# Patient Record
Sex: FEMALE | Race: WHITE | NOT HISPANIC OR LATINO | URBAN - METROPOLITAN AREA
[De-identification: names, ages, dates, MRNs, and addresses within clinical notes are randomized per-mention and may not be internally consistent; named-entity substitution may affect disease eponyms.]

---

## 2023-05-17 ENCOUNTER — APPOINTMENT (OUTPATIENT)
Dept: PRIMARY CARE | Facility: CLINIC | Age: 33
End: 2023-05-17
Payer: COMMERCIAL

## 2023-06-22 ENCOUNTER — OFFICE VISIT (OUTPATIENT)
Dept: PRIMARY CARE | Facility: CLINIC | Age: 33
End: 2023-06-22
Payer: COMMERCIAL

## 2023-06-22 VITALS
SYSTOLIC BLOOD PRESSURE: 112 MMHG | BODY MASS INDEX: 32.55 KG/M2 | WEIGHT: 172.4 LBS | RESPIRATION RATE: 18 BRPM | DIASTOLIC BLOOD PRESSURE: 80 MMHG | HEIGHT: 61 IN | HEART RATE: 121 BPM | OXYGEN SATURATION: 98 %

## 2023-06-22 DIAGNOSIS — F33.1 MODERATE EPISODE OF RECURRENT MAJOR DEPRESSIVE DISORDER (MULTI): Primary | ICD-10-CM

## 2023-06-22 PROCEDURE — 99214 OFFICE O/P EST MOD 30 MIN: CPT | Performed by: NURSE PRACTITIONER

## 2023-06-22 RX ORDER — ASPIRIN 325 MG
50000 TABLET, DELAYED RELEASE (ENTERIC COATED) ORAL
COMMUNITY
Start: 2021-08-09

## 2023-06-22 NOTE — ASSESSMENT & PLAN NOTE
Has tried Cymbalta, escitalopram, bupropion, BuSpar, Effexor and had side effects and did not help  Start Trintellix  Red flags discussed including SI  Also discussed starting CBT and talk therapy  Side effects discussed  Follow-up in 4 to 6 weeks

## 2023-06-22 NOTE — PROGRESS NOTES
"Subjective   Patient ID: Hannah Zayas is a 33 y.o. female who presents for Follow-up (Patient in today to discuss concerns of ongoing depression. Stated she thought she was scheduling for a CPE today. ).    Presents to follow up for depression  She states over the past year she has had generalized joint and body pain.  She stopped taking the cymbalta almost one year ago due to the side effects - pain was not improving   She is feeling more overwhelmed.    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  One month ago she stopped drinking and realized that she was drinking heavily every day and was helping her with her depression.  Now has decreased to 2-3 times a week    She has tried wellbutrin - made her extremely anxious.  Tried buspar - did not help.  Tried lexapro - only helped slightly.    Cymbalta - had worsening side effects.  Effexor - did 'ok' but then stopped helping             Review of Systems   All other systems reviewed and are negative.      Objective   /80   Pulse (!) 121   Resp 18   Ht 1.549 m (5' 1\")   Wt 78.2 kg (172 lb 6.4 oz)   SpO2 98%   BMI 32.57 kg/m²     Physical Exam  Vitals and nursing note reviewed.   Constitutional:       Appearance: Normal appearance.   HENT:      Head: Normocephalic and atraumatic.      Right Ear: External ear normal.      Left Ear: External ear normal.      Nose: Nose normal.      Mouth/Throat:      Mouth: Mucous membranes are moist.      Pharynx: Oropharynx is clear.   Eyes:      Conjunctiva/sclera: Conjunctivae normal.      Pupils: Pupils are equal, round, and reactive to light.   Cardiovascular:      Rate and Rhythm: Normal rate and regular rhythm.      Pulses: Normal pulses.      Heart sounds: Normal heart sounds.   Pulmonary:      Effort: Pulmonary effort is normal.      Breath sounds: Normal breath sounds.   Skin:     " General: Skin is warm and dry.   Neurological:      General: No focal deficit present.      Mental Status: She is alert and oriented to person, place, and time. Mental status is at baseline.   Psychiatric:         Mood and Affect: Mood normal.         Behavior: Behavior normal.         Thought Content: Thought content normal.         Judgment: Judgment normal.         Assessment/Plan   Problem List Items Addressed This Visit       Moderate episode of recurrent major depressive disorder (CMS/HCC) - Primary     Has tried Cymbalta, escitalopram, bupropion, BuSpar, Effexor and had side effects and did not help  Start Trintellix  Red flags discussed including SI  Also discussed starting CBT and talk therapy  Side effects discussed  Follow-up in 4 to 6 weeks         Relevant Medications    vortioxetine (Trintellix) 5 mg tablet tablet

## 2023-06-22 NOTE — PATIENT INSTRUCTIONS
Start the trintellix once a day  Give the pharmacist the copay card  If it is still expensive please let me know and I can see if another medication is covered  I may have to do an authorization with the insurance for this medication and that can take 7-10 business days

## 2023-10-09 ENCOUNTER — PATIENT MESSAGE (OUTPATIENT)
Dept: PRIMARY CARE | Facility: CLINIC | Age: 33
End: 2023-10-09
Payer: COMMERCIAL

## 2023-10-09 DIAGNOSIS — F33.1 MODERATE EPISODE OF RECURRENT MAJOR DEPRESSIVE DISORDER (MULTI): Primary | ICD-10-CM

## 2023-10-10 RX ORDER — FLUOXETINE HYDROCHLORIDE 20 MG/1
20 CAPSULE ORAL DAILY
Qty: 30 CAPSULE | Refills: 3 | Status: SHIPPED | OUTPATIENT
Start: 2023-10-10 | End: 2023-11-01

## 2023-10-18 RX ORDER — HYDROXYZINE HYDROCHLORIDE 50 MG/1
50 TABLET, FILM COATED ORAL NIGHTLY
Qty: 30 TABLET | Refills: 0 | Status: SHIPPED | OUTPATIENT
Start: 2023-10-18 | End: 2023-11-01

## 2024-01-31 ENCOUNTER — OFFICE VISIT (OUTPATIENT)
Dept: PRIMARY CARE | Facility: CLINIC | Age: 34
End: 2024-01-31
Payer: COMMERCIAL

## 2024-01-31 VITALS
WEIGHT: 171 LBS | HEIGHT: 62 IN | BODY MASS INDEX: 31.47 KG/M2 | OXYGEN SATURATION: 98 % | DIASTOLIC BLOOD PRESSURE: 88 MMHG | HEART RATE: 103 BPM | SYSTOLIC BLOOD PRESSURE: 128 MMHG

## 2024-01-31 DIAGNOSIS — F33.1 MODERATE EPISODE OF RECURRENT MAJOR DEPRESSIVE DISORDER (MULTI): Primary | ICD-10-CM

## 2024-01-31 PROCEDURE — 99214 OFFICE O/P EST MOD 30 MIN: CPT | Performed by: FAMILY MEDICINE

## 2024-01-31 RX ORDER — SERTRALINE HYDROCHLORIDE 50 MG/1
50 TABLET, FILM COATED ORAL DAILY
Qty: 30 TABLET | Refills: 1 | Status: SHIPPED | OUTPATIENT
Start: 2024-01-31 | End: 2024-02-29 | Stop reason: SDUPTHER

## 2024-01-31 RX ORDER — TRAZODONE HYDROCHLORIDE 100 MG/1
100 TABLET ORAL NIGHTLY PRN
Qty: 30 TABLET | Refills: 0 | Status: SHIPPED | OUTPATIENT
Start: 2024-01-31 | End: 2024-02-29 | Stop reason: SDUPTHER

## 2024-01-31 ASSESSMENT — PATIENT HEALTH QUESTIONNAIRE - PHQ9
SUM OF ALL RESPONSES TO PHQ9 QUESTIONS 1 AND 2: 6
10. IF YOU CHECKED OFF ANY PROBLEMS, HOW DIFFICULT HAVE THESE PROBLEMS MADE IT FOR YOU TO DO YOUR WORK, TAKE CARE OF THINGS AT HOME, OR GET ALONG WITH OTHER PEOPLE: EXTREMELY DIFFICULT
4. FEELING TIRED OR HAVING LITTLE ENERGY: NEARLY EVERY DAY
3. TROUBLE FALLING OR STAYING ASLEEP OR SLEEPING TOO MUCH: NEARLY EVERY DAY
9. THOUGHTS THAT YOU WOULD BE BETTER OFF DEAD, OR OF HURTING YOURSELF: SEVERAL DAYS
6. FEELING BAD ABOUT YOURSELF - OR THAT YOU ARE A FAILURE OR HAVE LET YOURSELF OR YOUR FAMILY DOWN: MORE THAN HALF THE DAYS
8. MOVING OR SPEAKING SO SLOWLY THAT OTHER PEOPLE COULD HAVE NOTICED. OR THE OPPOSITE, BEING SO FIGETY OR RESTLESS THAT YOU HAVE BEEN MOVING AROUND A LOT MORE THAN USUAL: NEARLY EVERY DAY
7. TROUBLE CONCENTRATING ON THINGS, SUCH AS READING THE NEWSPAPER OR WATCHING TELEVISION: NEARLY EVERY DAY
SUM OF ALL RESPONSES TO PHQ QUESTIONS 1-9: 23
1. LITTLE INTEREST OR PLEASURE IN DOING THINGS: NEARLY EVERY DAY
5. POOR APPETITE OR OVEREATING: MORE THAN HALF THE DAYS
2. FEELING DOWN, DEPRESSED OR HOPELESS: NEARLY EVERY DAY

## 2024-01-31 ASSESSMENT — PAIN SCALES - GENERAL: PAINLEVEL: 6

## 2024-01-31 NOTE — LETTER
January 31, 2024     Patient: Tata Zayas   YOB: 1990   Date of Visit: 1/31/2024       To Whom It May Concern:    Tata Zayas was seen in my clinic on 1/31/2024. Please excuse Tata for her absence from work on this day to make the appointment.    If you have any questions or concerns, please don't hesitate to call.         Sincerely,         Luz Ballesteros MD        CC: No Recipients

## 2024-01-31 NOTE — PROGRESS NOTES
"Subjective   Patient ID: Hannah Zayas is a 33 y.o. female who presents for Depression (Abnormal menses).    HPI   Tata presents complaining of depression.  Started on Prozac and after 4 days didn't sleep and had panic attack so quit taking.  Tried Trintillex by another provider which helped but had insurancve issues.  Has also tried cymbalta, lexapro, effexor, and Wellbutrin without side effects. Working with counselor. Not sleeping well so would also like to try something else to help that.     Review of Systems  All other systems have been reviewed and are negative except as noted in the HPI.       Objective   /88   Pulse 103   Ht 1.575 m (5' 2\")   Wt 77.6 kg (171 lb)   SpO2 98%   BMI 31.28 kg/m²     Physical Exam  Alert. NAD. Tearful at times. Flat mood. Judgement and insight intact    Assessment/Plan   Diagnoses and all orders for this visit:  Moderate episode of recurrent major depressive disorder (CMS/HCC)  -     sertraline (Zoloft) 50 mg tablet; Take 1 tablet (50 mg) by mouth once daily.  -     traZODone (Desyrel) 100 mg tablet; Take 1 tablet (100 mg) by mouth as needed at bedtime for sleep.  - Counseling discussed.  - Follow up in 1 month       "

## 2024-02-29 ENCOUNTER — TELEPHONE (OUTPATIENT)
Dept: PRIMARY CARE | Facility: CLINIC | Age: 34
End: 2024-02-29
Payer: COMMERCIAL

## 2024-02-29 DIAGNOSIS — F33.1 MODERATE EPISODE OF RECURRENT MAJOR DEPRESSIVE DISORDER (MULTI): ICD-10-CM

## 2024-02-29 NOTE — TELEPHONE ENCOUNTER
Rx Refill Request Telephone Encounter    Name:  Tata Zayas  :  [id number]  Medication Name:  Zoloft (out tomorrow), Trazodone            Specific Pharmacy location:  [facility]  Date of last appointment:    Date of next appointment:    Best number to reach patient:

## 2024-03-01 RX ORDER — TRAZODONE HYDROCHLORIDE 100 MG/1
100 TABLET ORAL NIGHTLY PRN
Qty: 30 TABLET | Refills: 0 | Status: SHIPPED | OUTPATIENT
Start: 2024-03-01 | End: 2024-04-17 | Stop reason: SDUPTHER

## 2024-03-01 RX ORDER — SERTRALINE HYDROCHLORIDE 50 MG/1
50 TABLET, FILM COATED ORAL DAILY
Qty: 90 TABLET | Refills: 0 | Status: SHIPPED | OUTPATIENT
Start: 2024-03-01 | End: 2024-05-24 | Stop reason: DRUGHIGH

## 2024-04-17 ENCOUNTER — OFFICE VISIT (OUTPATIENT)
Dept: PRIMARY CARE | Facility: CLINIC | Age: 34
End: 2024-04-17
Payer: COMMERCIAL

## 2024-04-17 VITALS
SYSTOLIC BLOOD PRESSURE: 122 MMHG | OXYGEN SATURATION: 96 % | WEIGHT: 177 LBS | HEART RATE: 108 BPM | DIASTOLIC BLOOD PRESSURE: 86 MMHG | BODY MASS INDEX: 32.37 KG/M2

## 2024-04-17 DIAGNOSIS — Q79.60 EHLERS-DANLOS DISEASE (HHS-HCC): ICD-10-CM

## 2024-04-17 DIAGNOSIS — M06.09 RHEUMATOID ARTHRITIS OF MULTIPLE SITES WITH NEGATIVE RHEUMATOID FACTOR (MULTI): ICD-10-CM

## 2024-04-17 DIAGNOSIS — F33.1 MODERATE EPISODE OF RECURRENT MAJOR DEPRESSIVE DISORDER (MULTI): Primary | ICD-10-CM

## 2024-04-17 DIAGNOSIS — Z00.00 WELL ADULT EXAM: ICD-10-CM

## 2024-04-17 PROBLEM — R79.89 LOW VITAMIN D LEVEL: Status: ACTIVE | Noted: 2024-04-17

## 2024-04-17 PROBLEM — K21.9 GASTROESOPHAGEAL REFLUX DISEASE: Status: ACTIVE | Noted: 2024-04-17

## 2024-04-17 PROCEDURE — 99213 OFFICE O/P EST LOW 20 MIN: CPT | Performed by: FAMILY MEDICINE

## 2024-04-17 RX ORDER — TRAZODONE HYDROCHLORIDE 100 MG/1
200 TABLET ORAL NIGHTLY PRN
Qty: 180 TABLET | Refills: 1 | Status: SHIPPED | OUTPATIENT
Start: 2024-04-17 | End: 2024-10-14

## 2024-04-17 ASSESSMENT — PATIENT HEALTH QUESTIONNAIRE - PHQ9
2. FEELING DOWN, DEPRESSED OR HOPELESS: SEVERAL DAYS
1. LITTLE INTEREST OR PLEASURE IN DOING THINGS: SEVERAL DAYS
10. IF YOU CHECKED OFF ANY PROBLEMS, HOW DIFFICULT HAVE THESE PROBLEMS MADE IT FOR YOU TO DO YOUR WORK, TAKE CARE OF THINGS AT HOME, OR GET ALONG WITH OTHER PEOPLE: VERY DIFFICULT
SUM OF ALL RESPONSES TO PHQ9 QUESTIONS 1 AND 2: 2

## 2024-04-17 ASSESSMENT — PAIN SCALES - GENERAL: PAINLEVEL: 5

## 2024-04-17 NOTE — PROGRESS NOTES
Subjective   Patient ID: Hannah Zayas is a 34 y.o. female who presents for med review (sertraline).    HPI   Hannah presents for follow-up on her depression.  She was seen in January and started on sertraline and trazodone.  In the past she has taken Cymbalta, Lexapro, Effexor, Wellbutrin and Trintellix.  Those were not as effective.  She did like the Trintellix but her insurance would not cover it. Feels the sertraline is improving. Still not sleeping well. Therapist feels she has ADHD and needs treatment. [facility].     Review of Systems  All other systems have been reviewed and are negative except as noted in the HPI.       Objective   /86 (BP Location: Left arm, Patient Position: Sitting)   Pulse 108   Wt 80.3 kg (177 lb)   SpO2 96%   BMI 32.37 kg/m²     Physical Exam  Alert. NAD. Judgement and insight intact. Mood is appropriate.    Assessment/Plan   Problem List Items Addressed This Visit             ICD-10-CM    Moderate episode of recurrent major depressive disorder (Multi) - Primary F33.1     Improving with sertraline.  Increase Trazodone to 200mg nightly to improve sleep.  Contact therapist for formal testing for ADHD through psychiatrist in group.         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 Begin gently stretching.         Relevant Orders    CBC and Auto Differential    Comprehensive Metabolic Panel    Hemoglobin A1C    Urinalysis with Reflex Microscopic    Rheumatoid arthritis of multiple sites with negative rheumatoid factor (Multi) M06.09     Pain is stable and chronic. Not seeing rheumatologist.  Recommend following up with Dr. Nanette Sanderson         Relevant Orders    CBC and Auto Differential    Comprehensive Metabolic Panel    Hemoglobin A1C    Urinalysis with  Reflex Microscopic     Other Visit Diagnoses         Codes    Well adult exam     Z00.00    Relevant Orders    CBC and Auto Differential    Comprehensive Metabolic Panel    Lipid Panel    Hemoglobin A1C    Urinalysis with Reflex Microscopic          Follow up for CPE in 6 months

## 2024-04-17 NOTE — ASSESSMENT & PLAN NOTE
Pain is stable and chronic. Not seeing rheumatologist.  Recommend following up with Dr. Nanette Sanderson

## 2024-05-04 ENCOUNTER — LAB (OUTPATIENT)
Dept: LAB | Facility: LAB | Age: 34
End: 2024-05-04
Payer: COMMERCIAL

## 2024-05-04 DIAGNOSIS — M06.09 RHEUMATOID ARTHRITIS OF MULTIPLE SITES WITH NEGATIVE RHEUMATOID FACTOR (MULTI): ICD-10-CM

## 2024-05-04 DIAGNOSIS — Z00.00 WELL ADULT EXAM: ICD-10-CM

## 2024-05-04 DIAGNOSIS — F33.1 MODERATE EPISODE OF RECURRENT MAJOR DEPRESSIVE DISORDER (MULTI): ICD-10-CM

## 2024-05-04 DIAGNOSIS — Q79.60 EHLERS-DANLOS DISEASE (HHS-HCC): ICD-10-CM

## 2024-05-04 LAB
ALBUMIN SERPL BCP-MCNC: 4.6 G/DL (ref 3.4–5)
ALP SERPL-CCNC: 51 U/L (ref 33–110)
ALT SERPL W P-5'-P-CCNC: 29 U/L (ref 7–45)
ANION GAP SERPL CALC-SCNC: 16 MMOL/L (ref 10–20)
APPEARANCE UR: ABNORMAL
AST SERPL W P-5'-P-CCNC: 17 U/L (ref 9–39)
BASOPHILS # BLD AUTO: 0.05 X10*3/UL (ref 0–0.1)
BASOPHILS NFR BLD AUTO: 0.5 %
BILIRUB SERPL-MCNC: 0.7 MG/DL (ref 0–1.2)
BILIRUB UR STRIP.AUTO-MCNC: NEGATIVE MG/DL
BUN SERPL-MCNC: 13 MG/DL (ref 6–23)
CALCIUM SERPL-MCNC: 9.4 MG/DL (ref 8.6–10.3)
CHLORIDE SERPL-SCNC: 100 MMOL/L (ref 98–107)
CHOLEST SERPL-MCNC: 225 MG/DL (ref 0–199)
CHOLESTEROL/HDL RATIO: 4.4
CO2 SERPL-SCNC: 26 MMOL/L (ref 21–32)
COLOR UR: YELLOW
CREAT SERPL-MCNC: 0.88 MG/DL (ref 0.5–1.05)
EGFRCR SERPLBLD CKD-EPI 2021: 88 ML/MIN/1.73M*2
EOSINOPHIL # BLD AUTO: 0.23 X10*3/UL (ref 0–0.7)
EOSINOPHIL NFR BLD AUTO: 2.1 %
ERYTHROCYTE [DISTWIDTH] IN BLOOD BY AUTOMATED COUNT: 12.4 % (ref 11.5–14.5)
EST. AVERAGE GLUCOSE BLD GHB EST-MCNC: 100 MG/DL
GLUCOSE SERPL-MCNC: 87 MG/DL (ref 74–99)
GLUCOSE UR STRIP.AUTO-MCNC: NEGATIVE MG/DL
HBA1C MFR BLD: 5.1 %
HCT VFR BLD AUTO: 45 % (ref 36–46)
HDLC SERPL-MCNC: 51.1 MG/DL
HGB BLD-MCNC: 14.7 G/DL (ref 12–16)
IMM GRANULOCYTES # BLD AUTO: 0.04 X10*3/UL (ref 0–0.7)
IMM GRANULOCYTES NFR BLD AUTO: 0.4 % (ref 0–0.9)
KETONES UR STRIP.AUTO-MCNC: NEGATIVE MG/DL
LDLC SERPL CALC-MCNC: 142 MG/DL
LEUKOCYTE ESTERASE UR QL STRIP.AUTO: NEGATIVE
LYMPHOCYTES # BLD AUTO: 2.32 X10*3/UL (ref 1.2–4.8)
LYMPHOCYTES NFR BLD AUTO: 21.2 %
MCH RBC QN AUTO: 31.6 PG (ref 26–34)
MCHC RBC AUTO-ENTMCNC: 32.7 G/DL (ref 32–36)
MCV RBC AUTO: 97 FL (ref 80–100)
MONOCYTES # BLD AUTO: 0.56 X10*3/UL (ref 0.1–1)
MONOCYTES NFR BLD AUTO: 5.1 %
NEUTROPHILS # BLD AUTO: 7.72 X10*3/UL (ref 1.2–7.7)
NEUTROPHILS NFR BLD AUTO: 70.7 %
NITRITE UR QL STRIP.AUTO: NEGATIVE
NON HDL CHOLESTEROL: 174 MG/DL (ref 0–149)
NRBC BLD-RTO: 0 /100 WBCS (ref 0–0)
PH UR STRIP.AUTO: 7 [PH]
PLATELET # BLD AUTO: 282 X10*3/UL (ref 150–450)
POTASSIUM SERPL-SCNC: 4.2 MMOL/L (ref 3.5–5.3)
PROT SERPL-MCNC: 6.8 G/DL (ref 6.4–8.2)
PROT UR STRIP.AUTO-MCNC: NEGATIVE MG/DL
RBC # BLD AUTO: 4.65 X10*6/UL (ref 4–5.2)
RBC # UR STRIP.AUTO: NEGATIVE /UL
SODIUM SERPL-SCNC: 138 MMOL/L (ref 136–145)
SP GR UR STRIP.AUTO: 1.02
TRIGL SERPL-MCNC: 162 MG/DL (ref 0–149)
UROBILINOGEN UR STRIP.AUTO-MCNC: <2 MG/DL
VLDL: 32 MG/DL (ref 0–40)
WBC # BLD AUTO: 10.9 X10*3/UL (ref 4.4–11.3)

## 2024-05-04 PROCEDURE — 80061 LIPID PANEL: CPT

## 2024-05-04 PROCEDURE — 81003 URINALYSIS AUTO W/O SCOPE: CPT

## 2024-05-04 PROCEDURE — 83036 HEMOGLOBIN GLYCOSYLATED A1C: CPT

## 2024-05-04 PROCEDURE — 85025 COMPLETE CBC W/AUTO DIFF WBC: CPT

## 2024-05-04 PROCEDURE — 80053 COMPREHEN METABOLIC PANEL: CPT

## 2024-05-04 PROCEDURE — 36415 COLL VENOUS BLD VENIPUNCTURE: CPT

## 2024-05-24 ENCOUNTER — TELEPHONE (OUTPATIENT)
Dept: PRIMARY CARE | Facility: CLINIC | Age: 34
End: 2024-05-24

## 2024-05-24 ENCOUNTER — OFFICE VISIT (OUTPATIENT)
Dept: PRIMARY CARE | Facility: CLINIC | Age: 34
End: 2024-05-24
Payer: COMMERCIAL

## 2024-05-24 VITALS
TEMPERATURE: 98.2 F | DIASTOLIC BLOOD PRESSURE: 72 MMHG | OXYGEN SATURATION: 98 % | BODY MASS INDEX: 33.8 KG/M2 | WEIGHT: 184.8 LBS | HEART RATE: 113 BPM | SYSTOLIC BLOOD PRESSURE: 100 MMHG

## 2024-05-24 DIAGNOSIS — M06.09 RHEUMATOID ARTHRITIS OF MULTIPLE SITES WITH NEGATIVE RHEUMATOID FACTOR (MULTI): ICD-10-CM

## 2024-05-24 DIAGNOSIS — F33.1 MODERATE EPISODE OF RECURRENT MAJOR DEPRESSIVE DISORDER (MULTI): ICD-10-CM

## 2024-05-24 DIAGNOSIS — E66.1 CLASS 1 DRUG-INDUCED OBESITY WITH SERIOUS COMORBIDITY AND BODY MASS INDEX (BMI) OF 33.0 TO 33.9 IN ADULT: Primary | ICD-10-CM

## 2024-05-24 PROCEDURE — 99213 OFFICE O/P EST LOW 20 MIN: CPT | Performed by: FAMILY MEDICINE

## 2024-05-24 PROCEDURE — 3008F BODY MASS INDEX DOCD: CPT | Performed by: FAMILY MEDICINE

## 2024-05-24 RX ORDER — SEMAGLUTIDE 0.25 MG/.5ML
0.25 INJECTION, SOLUTION SUBCUTANEOUS
Qty: 2 ML | Refills: 0 | Status: SHIPPED | OUTPATIENT
Start: 2024-05-26 | End: 2024-06-17

## 2024-05-24 RX ORDER — SERTRALINE HYDROCHLORIDE 100 MG/1
100 TABLET, FILM COATED ORAL DAILY
Qty: 90 TABLET | Refills: 0 | Status: SHIPPED | OUTPATIENT
Start: 2024-05-24 | End: 2024-08-22

## 2024-05-24 ASSESSMENT — PAIN SCALES - GENERAL: PAINLEVEL: 5

## 2024-05-24 NOTE — TELEPHONE ENCOUNTER
[facility] pharmacy sent a fax patients Wegovy 0.25 mg/0.5 ML Pen is not covered by patients insurance and they are requesting an alternative medication.  Please advise.

## 2024-05-24 NOTE — PROGRESS NOTES
Subjective   Patient ID: Hannah Zayas is a 34 y.o. female who presents for Follow-up.    HPI   Hannah presents for obesity.  She has been trying to modify her diet. Unable to lose weight.  Exercise is a major issue due to her joint pain and muscle aches from her underlying Ehler-Danlos and RA.  Working with rheumatologist to control her pain but knows that she needs to lose weight for improvement.    Review of Systems  All other systems have been reviewed and are negative except as noted in the HPI.       Objective   /72 (BP Location: Left arm)   Pulse (!) 113   Temp 36.8 °C (98.2 °F)   Wt 83.8 kg (184 lb 12.8 oz)   SpO2 98%   BMI 33.80 kg/m²     Physical Exam  Alert. NAD. Judgement and insight intact.    Assessment/Plan   Problem List Items Addressed This Visit             ICD-10-CM    Moderate episode of recurrent major depressive disorder (Multi) F33.1    Relevant Medications    sertraline (Zoloft) 100 mg tablet    Rheumatoid arthritis of multiple sites with negative rheumatoid factor (Multi) M06.09    Relevant Medications    semaglutide, weight loss, (Wegovy) 0.25 mg/0.5 mL pen injector     Other Visit Diagnoses         Codes    Class 1 drug-induced obesity with serious comorbidity and body mass index (BMI) of 33.0 to 33.9 in adult    -  Primary  Treatment options discussed.  Given her RA and EDS recommend beginning Wegovy.  Indications, benefits and potential side effects discussed.  E66.1, Z68.33    Relevant Medications    semaglutide, weight loss, (Wegovy) 0.25 mg/0.5 mL pen injector

## 2024-07-10 ENCOUNTER — APPOINTMENT (OUTPATIENT)
Dept: PRIMARY CARE | Facility: CLINIC | Age: 34
End: 2024-07-10
Payer: COMMERCIAL

## 2024-07-18 ENCOUNTER — TELEPHONE (OUTPATIENT)
Dept: PRIMARY CARE | Facility: CLINIC | Age: 34
End: 2024-07-18
Payer: COMMERCIAL

## 2024-07-18 DIAGNOSIS — Z00.00 WELL ADULT EXAM: ICD-10-CM

## 2024-07-18 DIAGNOSIS — Z11.59 NEED FOR HEPATITIS C SCREENING TEST: ICD-10-CM

## 2024-07-18 DIAGNOSIS — Z11.4 ENCOUNTER FOR SCREENING FOR HIV: ICD-10-CM

## 2024-08-26 DIAGNOSIS — F33.1 MODERATE EPISODE OF RECURRENT MAJOR DEPRESSIVE DISORDER (MULTI): ICD-10-CM

## 2024-08-26 RX ORDER — SERTRALINE HYDROCHLORIDE 100 MG/1
100 TABLET, FILM COATED ORAL DAILY
Qty: 90 TABLET | Refills: 0 | Status: SHIPPED | OUTPATIENT
Start: 2024-08-26

## 2024-09-21 ENCOUNTER — LAB (OUTPATIENT)
Dept: LAB | Facility: LAB | Age: 34
End: 2024-09-21
Payer: COMMERCIAL

## 2024-09-21 DIAGNOSIS — Z00.00 WELL ADULT EXAM: ICD-10-CM

## 2024-09-21 DIAGNOSIS — Z11.59 NEED FOR HEPATITIS C SCREENING TEST: ICD-10-CM

## 2024-09-21 DIAGNOSIS — Z11.4 ENCOUNTER FOR SCREENING FOR HIV: ICD-10-CM

## 2024-09-21 LAB
ALBUMIN SERPL BCP-MCNC: 4.4 G/DL (ref 3.4–5)
ALP SERPL-CCNC: 58 U/L (ref 33–110)
ALT SERPL W P-5'-P-CCNC: 66 U/L (ref 7–45)
ANION GAP SERPL CALC-SCNC: 16 MMOL/L (ref 10–20)
AST SERPL W P-5'-P-CCNC: 46 U/L (ref 9–39)
BILIRUB SERPL-MCNC: 0.4 MG/DL (ref 0–1.2)
BUN SERPL-MCNC: 13 MG/DL (ref 6–23)
CALCIUM SERPL-MCNC: 10 MG/DL (ref 8.6–10.3)
CHLORIDE SERPL-SCNC: 102 MMOL/L (ref 98–107)
CHOLEST SERPL-MCNC: 227 MG/DL (ref 0–199)
CHOLESTEROL/HDL RATIO: 5.6
CO2 SERPL-SCNC: 26 MMOL/L (ref 21–32)
CREAT SERPL-MCNC: 0.7 MG/DL (ref 0.5–1.05)
EGFRCR SERPLBLD CKD-EPI 2021: >90 ML/MIN/1.73M*2
GLUCOSE SERPL-MCNC: 97 MG/DL (ref 74–99)
HCV AB SER QL: NONREACTIVE
HDLC SERPL-MCNC: 40.4 MG/DL
HIV 1+2 AB+HIV1 P24 AG SERPL QL IA: NONREACTIVE
LDLC SERPL CALC-MCNC: 146 MG/DL
NON HDL CHOLESTEROL: 187 MG/DL (ref 0–149)
POTASSIUM SERPL-SCNC: 4.5 MMOL/L (ref 3.5–5.3)
PROT SERPL-MCNC: 6.7 G/DL (ref 6.4–8.2)
SODIUM SERPL-SCNC: 139 MMOL/L (ref 136–145)
TRIGL SERPL-MCNC: 205 MG/DL (ref 0–149)
VLDL: 41 MG/DL (ref 0–40)

## 2024-09-21 PROCEDURE — 36415 COLL VENOUS BLD VENIPUNCTURE: CPT

## 2024-09-21 PROCEDURE — 80061 LIPID PANEL: CPT

## 2024-09-21 PROCEDURE — 80053 COMPREHEN METABOLIC PANEL: CPT

## 2024-09-21 PROCEDURE — 87389 HIV-1 AG W/HIV-1&-2 AB AG IA: CPT

## 2024-09-21 PROCEDURE — 86803 HEPATITIS C AB TEST: CPT

## 2024-09-23 ENCOUNTER — LAB (OUTPATIENT)
Dept: LAB | Facility: LAB | Age: 34
End: 2024-09-23
Payer: COMMERCIAL

## 2024-09-23 ENCOUNTER — APPOINTMENT (OUTPATIENT)
Dept: RHEUMATOLOGY | Facility: CLINIC | Age: 34
End: 2024-09-23
Payer: COMMERCIAL

## 2024-09-23 VITALS
HEART RATE: 91 BPM | HEIGHT: 61 IN | OXYGEN SATURATION: 95 % | BODY MASS INDEX: 35.87 KG/M2 | WEIGHT: 190 LBS | SYSTOLIC BLOOD PRESSURE: 141 MMHG | DIASTOLIC BLOOD PRESSURE: 94 MMHG

## 2024-09-23 DIAGNOSIS — M25.50 HYPERMOBILITY ARTHRALGIA: ICD-10-CM

## 2024-09-23 DIAGNOSIS — M79.7 FIBROMYALGIA: ICD-10-CM

## 2024-09-23 DIAGNOSIS — M25.50 HYPERMOBILITY ARTHRALGIA: Primary | ICD-10-CM

## 2024-09-23 DIAGNOSIS — R29.818 SUSPECTED SLEEP APNEA: ICD-10-CM

## 2024-09-23 LAB
CRP SERPL-MCNC: 0.83 MG/DL
ERYTHROCYTE [SEDIMENTATION RATE] IN BLOOD BY WESTERGREN METHOD: 9 MM/H (ref 0–20)
URATE SERPL-MCNC: 9.3 MG/DL (ref 2.3–6.7)

## 2024-09-23 PROCEDURE — 86663 EPSTEIN-BARR ANTIBODY: CPT

## 2024-09-23 PROCEDURE — 82784 ASSAY IGA/IGD/IGG/IGM EACH: CPT

## 2024-09-23 PROCEDURE — 86160 COMPLEMENT ANTIGEN: CPT

## 2024-09-23 PROCEDURE — 85652 RBC SED RATE AUTOMATED: CPT

## 2024-09-23 PROCEDURE — 86431 RHEUMATOID FACTOR QUANT: CPT

## 2024-09-23 PROCEDURE — 86618 LYME DISEASE ANTIBODY: CPT

## 2024-09-23 PROCEDURE — 86664 EPSTEIN-BARR NUCLEAR ANTIGEN: CPT

## 2024-09-23 PROCEDURE — 82607 VITAMIN B-12: CPT

## 2024-09-23 PROCEDURE — 36415 COLL VENOUS BLD VENIPUNCTURE: CPT

## 2024-09-23 PROCEDURE — 84550 ASSAY OF BLOOD/URIC ACID: CPT

## 2024-09-23 PROCEDURE — 86225 DNA ANTIBODY NATIVE: CPT

## 2024-09-23 PROCEDURE — 99204 OFFICE O/P NEW MOD 45 MIN: CPT | Performed by: INTERNAL MEDICINE

## 2024-09-23 PROCEDURE — 86665 EPSTEIN-BARR CAPSID VCA: CPT

## 2024-09-23 PROCEDURE — 86038 ANTINUCLEAR ANTIBODIES: CPT

## 2024-09-23 PROCEDURE — 86235 NUCLEAR ANTIGEN ANTIBODY: CPT

## 2024-09-23 PROCEDURE — 86200 CCP ANTIBODY: CPT

## 2024-09-23 PROCEDURE — 82306 VITAMIN D 25 HYDROXY: CPT

## 2024-09-23 PROCEDURE — 86140 C-REACTIVE PROTEIN: CPT

## 2024-09-23 PROCEDURE — 3008F BODY MASS INDEX DOCD: CPT | Performed by: INTERNAL MEDICINE

## 2024-09-23 PROCEDURE — 82164 ANGIOTENSIN I ENZYME TEST: CPT

## 2024-09-23 ASSESSMENT — PATIENT HEALTH QUESTIONNAIRE - PHQ9
2. FEELING DOWN, DEPRESSED OR HOPELESS: NOT AT ALL
1. LITTLE INTEREST OR PLEASURE IN DOING THINGS: NOT AT ALL
SUM OF ALL RESPONSES TO PHQ9 QUESTIONS 1 AND 2: 0

## 2024-09-23 ASSESSMENT — ENCOUNTER SYMPTOMS
SLEEP DISTURBANCE: 1
DEPRESSION: 1
LOSS OF SENSATION IN FEET: 1
FATIGUE: 1
PALPITATIONS: 1
OCCASIONAL FEELINGS OF UNSTEADINESS: 1

## 2024-09-23 ASSESSMENT — PAIN SCALES - GENERAL: PAINLEVEL: 7

## 2024-09-23 NOTE — PROGRESS NOTES
"Subjective   Patient ID: Tata Zayas \"Lizeth" is a 34 y.o. female who presents for New Patient Visit.  HPI  Patient with hypermobility arthralgia previously seen by Dr. Vikash Banegas.    I had seen her in August 2021 and I did not feel that she had any inflammatory arthropathy.  With Dr. Banegas she had been tried on medication such as hydroxychloroquine and allopurinol.  Hydroxychloroquine not help any of her symptoms.  She had done physical therapy previously.  When I last saw her she was on duloxetine which she felt helped with her anxiety and depression and initially it did help with her musculoskeletal issues.    According to Dr. Banegas's notes she was being treated for fibromyalgia, periarthritis, joint hypermobility syndrome, osteoarthritis of the knees and ankles and hypovitaminosis D.  She has also been seen by Dr. Nanette Sanderson in 2022.  She had x-rays of the lumbar spine that showed minimal levoscoliosis centered in the lower lumbar region.    She no longer is on duloxetine and is currently on Zoloft 100 mg daily.  Also currently on trazodone  Is in PT right now for her shoulder neck and back.  Shoulders seem to sag out of joint.   Has been in PT in August. She hasn't seen too much.  She goes once a week.  Does stretches and elastic bands.    She feels like her lymph nodes have been swollen since 2021 and are painful at time  She always feels fluish so hard to say if she has had a virus.  She feels like she may have sleep apnea.  She also thinks that she might have some food sensitivities but does not think that she can do an elimination diet to sort it out.      Review of Systems   Constitutional:  Positive for fatigue.        Fluish   HENT:          Enlarged glands, +dry mouth   Eyes:         +dry eyes  +seasonal allergies   Respiratory:          +vape   Cardiovascular:  Positive for palpitations.   Gastrointestinal:         +heartburn, diarrhea   Genitourinary:         Regular periods   Musculoskeletal:  "        Per HPI   Psychiatric/Behavioral:  Positive for sleep disturbance.        Objective   Physical Exam  GEN: NAD A&O x3 appropriate affect  HENT:no enlarged glands or thyroid  EYES: no conjunctival redness, eyelids normal  LYMPH: no cervical lymphadenopathy  CV: RRR, no MRG, no lower extremity edema  RESP: CTA B/L with normal respiratory effort and no intercostal retractions  ABD: Soft non-tender   NEURO: 5/5 strength upper and lower extremities, DTR +2 bicep and patellar tendons  SKIN: no rashes, ulcers, or subcutaneous nodules  PULSES: +radials  TENDER POINTS: 14/18   MSK:  Hyperextension of her PIPs able to move her CMC joint and out but no hyperextension of the first digit only of the fifth mild of the elbows tenderness in the proximal upper extremities. TMJ tenderness paraspinal tenderness especially in the lower spine and L5 area tenderness in the lower extremities   Assessment/Plan     Hypermobility arthralgia   -Has been in physical therapy for this    Fibromyalgia   -I do feel that her exam may be consistent with this.  Per Dr. Banegas's notes that she was treating her for this condition.   -Has been on duloxetine in the past.  Discussed other treatments such as Lyrica or gabapentin or tricyclic's.    She does have fatigue, daytime somnolence and has some suspicion of sleep apnea.  Will send for sleep study    Will do blood work again to see if any other etiology of her symptoms     Will discuss when it has returned.    Meghan Mcarthur MD 09/23/24 1:40 PM

## 2024-09-24 LAB
25(OH)D3 SERPL-MCNC: 24 NG/ML (ref 30–100)
ANA SER QL HEP2 SUBST: NEGATIVE
C3 SERPL-MCNC: 163 MG/DL (ref 87–200)
C4 SERPL-MCNC: 43 MG/DL (ref 10–50)
CCP IGG SERPL-ACNC: <1 U/ML
CENTROMERE B AB SER-ACNC: <0.2 AI
CHROMATIN AB SERPL-ACNC: <0.2 AI
DSDNA AB SER-ACNC: 2 IU/ML
EBV EA IGG SER QL: NEGATIVE
EBV NA AB SER QL: POSITIVE
EBV VCA IGG SER IA-ACNC: POSITIVE
EBV VCA IGM SER IA-ACNC: NEGATIVE
ENA JO1 AB SER QL IA: <0.2 AI
ENA RNP AB SER IA-ACNC: <0.2 AI
ENA SCL70 AB SER QL IA: <0.2 AI
ENA SM AB SER IA-ACNC: <0.2 AI
ENA SM+RNP AB SER QL IA: <0.2 AI
ENA SS-A AB SER IA-ACNC: <0.2 AI
ENA SS-B AB SER IA-ACNC: <0.2 AI
IGA SERPL-MCNC: 176 MG/DL (ref 70–400)
IGG SERPL-MCNC: 694 MG/DL (ref 700–1600)
IGG SERPL-MCNC: 694 MG/DL (ref 700–1600)
IGG1 SER-MCNC: 459 MG/DL (ref 490–1140)
IGG2 SER-MCNC: 283 MG/DL (ref 150–640)
IGG3 SER-MCNC: 25 MG/DL (ref 11–85)
IGG4 SER-MCNC: 56 MG/DL (ref 3–200)
IGM SERPL-MCNC: 113 MG/DL (ref 40–230)
RHEUMATOID FACT SER NEPH-ACNC: <10 IU/ML (ref 0–15)
RIBOSOMAL P AB SER-ACNC: <0.2 AI
VIT B12 SERPL-MCNC: 497 PG/ML (ref 211–911)

## 2024-09-25 LAB
ACE SERPL-CCNC: 36 U/L (ref 16–85)
B BURGDOR.VLSE1+PEPC10 AB SER IA-ACNC: 0.19 IV

## 2024-09-26 ENCOUNTER — PATIENT MESSAGE (OUTPATIENT)
Dept: RHEUMATOLOGY | Facility: CLINIC | Age: 34
End: 2024-09-26
Payer: COMMERCIAL

## 2024-09-27 ENCOUNTER — APPOINTMENT (OUTPATIENT)
Dept: PRIMARY CARE | Facility: CLINIC | Age: 34
End: 2024-09-27
Payer: COMMERCIAL

## 2024-09-27 ENCOUNTER — TELEPHONE (OUTPATIENT)
Dept: PRIMARY CARE | Facility: CLINIC | Age: 34
End: 2024-09-27

## 2024-09-27 VITALS
OXYGEN SATURATION: 96 % | HEIGHT: 61 IN | SYSTOLIC BLOOD PRESSURE: 120 MMHG | HEART RATE: 92 BPM | DIASTOLIC BLOOD PRESSURE: 84 MMHG | BODY MASS INDEX: 34.93 KG/M2 | WEIGHT: 185 LBS

## 2024-09-27 DIAGNOSIS — Z00.00 WELL ADULT EXAM: Primary | ICD-10-CM

## 2024-09-27 DIAGNOSIS — Z12.4 PAP SMEAR FOR CERVICAL CANCER SCREENING: ICD-10-CM

## 2024-09-27 DIAGNOSIS — Q79.60 EHLERS-DANLOS DISEASE (HHS-HCC): ICD-10-CM

## 2024-09-27 DIAGNOSIS — Z11.51 SCREENING FOR HPV (HUMAN PAPILLOMAVIRUS): ICD-10-CM

## 2024-09-27 DIAGNOSIS — F33.1 MODERATE EPISODE OF RECURRENT MAJOR DEPRESSIVE DISORDER: ICD-10-CM

## 2024-09-27 DIAGNOSIS — N94.6 DYSMENORRHEA: ICD-10-CM

## 2024-09-27 PROBLEM — M06.09 RHEUMATOID ARTHRITIS OF MULTIPLE SITES WITH NEGATIVE RHEUMATOID FACTOR (MULTI): Status: RESOLVED | Noted: 2024-04-17 | Resolved: 2024-09-27

## 2024-09-27 PROCEDURE — 99395 PREV VISIT EST AGE 18-39: CPT | Performed by: FAMILY MEDICINE

## 2024-09-27 PROCEDURE — 99213 OFFICE O/P EST LOW 20 MIN: CPT | Performed by: FAMILY MEDICINE

## 2024-09-27 PROCEDURE — 3008F BODY MASS INDEX DOCD: CPT | Performed by: FAMILY MEDICINE

## 2024-09-27 PROCEDURE — 87624 HPV HI-RISK TYP POOLED RSLT: CPT

## 2024-09-27 ASSESSMENT — PAIN SCALES - GENERAL: PAINLEVEL: 8

## 2024-09-27 NOTE — PROGRESS NOTES
"Subjective   Patient ID: Tata Zayas \"Lizeth" is a 34 y.o. female who presents for Annual Exam (Last pap 2019.).    HPI  Patient Care Team:  Luz Ballesteros MD as PCP - General  Luz Ballesteros MD as PCP - [facility] PCP    Hannah Zayas is seen for comprehensive physical exam.  PMH, PSH, family history and social history were reviewed and updated.  GYN - Pap 2019 negative with negative HPV, cycle is regular but she has dyspareunia, worsening cramps, bleeding after intercourse, and feels like she is shedding tissue all month,   Nura-Danlos -chronic pain.  She saw a rheumatology and did not feel like it was completely helpful.  She feels defeated    Review of Systems   Constitutional:  Negative for chills, fever and unexpected weight change.   HENT:  Negative for congestion, ear pain, hearing loss, rhinorrhea, sore throat and trouble swallowing.    Eyes:  Negative for visual disturbance.   Respiratory:  Negative for cough and shortness of breath.    Cardiovascular:  Negative for chest pain and leg swelling.   Gastrointestinal:  Negative for abdominal pain, blood in stool, nausea and vomiting.   Genitourinary:  Negative for dysuria, hematuria, vaginal bleeding and vaginal discharge.   Musculoskeletal:  Negative for arthralgias and myalgias.   Skin:  Negative for rash.   Neurological:  Negative for dizziness, weakness and numbness.   Psychiatric/Behavioral:  Negative for dysphoric mood. The patient is not nervous/anxious.        Objective   /84   Pulse 92   Ht 1.549 m (5' 1\")   Wt 83.9 kg (185 lb)   LMP 09/16/2024 (Exact Date)   SpO2 96%   BMI 34.96 kg/m²     Physical Exam  Vitals and nursing note reviewed.   Constitutional:       General: She is not in acute distress.  HENT:      Right Ear: Tympanic membrane and ear canal normal.      Left Ear: Tympanic membrane and ear canal normal.      Nose: Nose normal.      Mouth/Throat:      Mouth: Mucous membranes are moist.   Eyes:      Extraocular " Movements: Extraocular movements intact.      Pupils: Pupils are equal, round, and reactive to light.   Cardiovascular:      Rate and Rhythm: Normal rate and regular rhythm.   Pulmonary:      Effort: Pulmonary effort is normal.      Breath sounds: Normal breath sounds.   Abdominal:      General: Abdomen is flat.      Palpations: Abdomen is soft.      Tenderness: There is no abdominal tenderness.   Genitourinary:     General: Normal vulva.      Vagina: Normal.      Cervix: Normal.      Uterus: Normal.    Musculoskeletal:         General: Normal range of motion.   Lymphadenopathy:      Cervical: No cervical adenopathy.   Skin:     General: Skin is warm.      Findings: No rash.   Neurological:      General: No focal deficit present.      Mental Status: She is alert.   Psychiatric:         Mood and Affect: Mood normal.         Assessment/Plan   Assessment & Plan  Well adult exam  Preventative measures discussed in detail.  Immunizations reviewed and discussed.  Reviewed labs with patient.         Moderate episode of recurrent major depressive disorder  Stable.  Really flared by her chronic pain.  Continue with current medication management       Nura-Danlos disease ([facility])  Continue follow-up with rheumatologist.  Recommend referral to pain management to see if they can be of assistance in her chronic daily pain  Orders:    Referral to Pain Medicine; Future    Screening for HPV (human papillomavirus)    Orders:    THINPREP PAP TEST    Pap smear for cervical cancer screening    Orders:    THINPREP PAP TEST    Dysmenorrhea  Recommend referral to GYN for further evaluation  Orders:    Referral to Gynecology; Future        Follow up 6 MONTHS, sooner with any problems or concerns.

## 2024-09-29 ASSESSMENT — ENCOUNTER SYMPTOMS
NERVOUS/ANXIOUS: 0
FEVER: 0
DYSURIA: 0
DIZZINESS: 0
ARTHRALGIAS: 0
UNEXPECTED WEIGHT CHANGE: 0
MYALGIAS: 0
SHORTNESS OF BREATH: 0
HEMATURIA: 0
RHINORRHEA: 0
NAUSEA: 0
ABDOMINAL PAIN: 0
CHILLS: 0
NUMBNESS: 0
BLOOD IN STOOL: 0
VOMITING: 0
TROUBLE SWALLOWING: 0
WEAKNESS: 0
DYSPHORIC MOOD: 0
COUGH: 0
SORE THROAT: 0

## 2024-09-30 NOTE — ASSESSMENT & PLAN NOTE
Continue follow-up with rheumatologist.  Recommend referral to pain management to see if they can be of assistance in her chronic daily pain  Orders:    Referral to Pain Medicine; Future

## 2024-10-02 ENCOUNTER — OFFICE VISIT (OUTPATIENT)
Dept: PAIN MEDICINE | Facility: CLINIC | Age: 34
End: 2024-10-02
Payer: COMMERCIAL

## 2024-10-02 DIAGNOSIS — G89.29 CHRONIC PRIMARY LOW BACK PAIN: ICD-10-CM

## 2024-10-02 DIAGNOSIS — M79.7 FIBROMYALGIA: Primary | ICD-10-CM

## 2024-10-02 DIAGNOSIS — Q79.60 EHLERS-DANLOS DISEASE (HHS-HCC): ICD-10-CM

## 2024-10-02 DIAGNOSIS — M54.59 CHRONIC PRIMARY LOW BACK PAIN: ICD-10-CM

## 2024-10-02 PROCEDURE — 99204 OFFICE O/P NEW MOD 45 MIN: CPT | Performed by: PHYSICAL MEDICINE & REHABILITATION

## 2024-10-02 RX ORDER — GABAPENTIN 300 MG/1
300 CAPSULE ORAL 3 TIMES DAILY
Qty: 90 CAPSULE | Refills: 0 | Status: SHIPPED | OUTPATIENT
Start: 2024-10-02 | End: 2024-11-01

## 2024-10-02 NOTE — PROGRESS NOTES
Chief complaint  Pain in multiple joints    History  Hannah Zayas is referred for initial  pain management office visit  Have Nura danlos and fibromyalgia   Started as teenager. Had advil but then had GI upset  Tried cymbalta for few years then stopped for neurological symptoms  Tried TENS unit in past and that helped .   Cupping helped but did not have acupuncture  Pain and burnign in the upper and lower body and joints  The pain is interfering with activities of daily living, quality of life and quality of sleep. It is limiting the functions and everything takes longer to complete because of the slowing related to the pain. Movements are cautious to avoid aggravation of the symptoms.     Working as  and working at desk job       Review of Systems :  Denied any fever or chills. No weight loss and no night sweats. No cough or sputum production. No diarrhea   The constipation has been responding to fibers and over the counter medications.     No bladder and bowel incontinence and no other changes in bladder and bowel. No skin changes.  Reports tiredness and fatigability only if the pain is not controlled.      Physical examination  Awake, alert and oriented for time place and persons   declined Chaperone for the visit and was adequately  draped for the exam.    Has hyperlaxity over the wrist bilaterally  There is tenderness on palpation at the points of the upper trapezius,  sterno clavicular joints, elbows, knees, lower back, were all sensitive to touch and minor pressure  producing whitening of the tips of the thumb nail produced the pain.     Back pain with lower spine. No over radicular symptoms.   Limited ROM    Diagnosis  Problem List Items Addressed This Visit       Nura-Danlos disease ([facility])    Fibromyalgia - Primary    Relevant Medications    gabapentin (Neurontin) 300 mg capsule    Other Relevant Orders    Tens Device Four Lead    Chronic primary low back pain         Plan  Reviewed the pain generators.  Went over the types of pain with neuropathic and nociceptive and different pathologies and therapeutic modalities. Discussed the mechanism of action of interventions from acupuncture, physical therapy , regular exercises, injections, botox, spinal cord stimulation, and role of surgery     Went over pathology of the  the joints and fibromylgia  Tried medication did not have help  Consider TENS unit if approved by insurance company  Also dw her about acup and mechanism of action and will check on that with insurance  Also dw her about lori 300 mg and titrate Start with one cap at night for 3 nights , then one cap twice a day for 3 days. And then continue with 3 times a day     Discussed about NSAIDS and I explained about the opioids sparing effect to allow keeping the opioids dose at minimal effective dose.   I went over the potential side effects of the NSAIDS on the gastrointestinal, renal and cardiovascular systems.      I detailed the side effects from the acetaminophen in the medication and made aware of those. I also explained about the cumulative effects on the organs and mainly the liver.     Follow-up after above or earlier if needed     The level of clinical decision making in this office visit,  is high, given the high risks of complications with the morbidity and mortality due to the fact that acute and chronic pain may pose a threat to life and bodily function, if under treated, poorly treated, or with failure to maintain adequate treatment and timely medical follow up. Additionally over treatment has its own set of complications including overdosing on the pain medications and also the habit forming potentials with the use of the medications used to treat chronic painful conditions including therapeutic classes classified as dangerous medications. Given the serious and fluctuating nature of pain level and instensity with extensive consideration for whenever pain  changes, there is always the risk of prolonged functional impairment requiring close patient monitoring with regular assessments and reassessments and high level medical decision making at every office visit. The amount and complexity of data reviewed is high given the patient clinical presentation, labs,  data, radiology reports, and other tests as discussed during office visits. Pertinent data whether positive or negative were taken in consideration in the process of making this high level medical decision.

## 2024-10-09 ENCOUNTER — APPOINTMENT (OUTPATIENT)
Dept: PAIN MEDICINE | Facility: CLINIC | Age: 34
End: 2024-10-09
Payer: COMMERCIAL

## 2024-11-23 DIAGNOSIS — F33.1 MODERATE EPISODE OF RECURRENT MAJOR DEPRESSIVE DISORDER: ICD-10-CM

## 2024-11-25 DIAGNOSIS — F33.1 MODERATE EPISODE OF RECURRENT MAJOR DEPRESSIVE DISORDER: ICD-10-CM

## 2024-11-25 RX ORDER — TRAZODONE HYDROCHLORIDE 100 MG/1
200 TABLET ORAL NIGHTLY PRN
Qty: 180 TABLET | Refills: 1 | Status: SHIPPED | OUTPATIENT
Start: 2024-11-25 | End: 2025-05-24

## 2024-11-25 RX ORDER — SERTRALINE HYDROCHLORIDE 100 MG/1
100 TABLET, FILM COATED ORAL DAILY
Qty: 90 TABLET | Refills: 1 | Status: SHIPPED | OUTPATIENT
Start: 2024-11-25

## 2024-12-09 ENCOUNTER — APPOINTMENT (OUTPATIENT)
Dept: RHEUMATOLOGY | Facility: CLINIC | Age: 34
End: 2024-12-09
Payer: COMMERCIAL

## 2025-01-09 ENCOUNTER — APPOINTMENT (OUTPATIENT)
Dept: RADIOLOGY | Facility: HOSPITAL | Age: 35
End: 2025-01-09
Payer: COMMERCIAL

## 2025-01-09 ENCOUNTER — APPOINTMENT (OUTPATIENT)
Dept: CARDIOLOGY | Facility: HOSPITAL | Age: 35
End: 2025-01-09
Payer: COMMERCIAL

## 2025-01-09 ENCOUNTER — OFFICE VISIT (OUTPATIENT)
Dept: GASTROENTEROLOGY | Facility: CLINIC | Age: 35
End: 2025-01-09
Payer: COMMERCIAL

## 2025-01-09 ENCOUNTER — HOSPITAL ENCOUNTER (EMERGENCY)
Facility: HOSPITAL | Age: 35
Discharge: HOME | End: 2025-01-09
Attending: EMERGENCY MEDICINE
Payer: COMMERCIAL

## 2025-01-09 VITALS
HEART RATE: 88 BPM | RESPIRATION RATE: 16 BRPM | HEIGHT: 61 IN | SYSTOLIC BLOOD PRESSURE: 137 MMHG | TEMPERATURE: 97.9 F | WEIGHT: 176 LBS | BODY MASS INDEX: 33.23 KG/M2 | OXYGEN SATURATION: 97 % | DIASTOLIC BLOOD PRESSURE: 90 MMHG

## 2025-01-09 VITALS
HEART RATE: 104 BPM | OXYGEN SATURATION: 94 % | SYSTOLIC BLOOD PRESSURE: 112 MMHG | BODY MASS INDEX: 33.07 KG/M2 | DIASTOLIC BLOOD PRESSURE: 77 MMHG | WEIGHT: 175 LBS

## 2025-01-09 DIAGNOSIS — R11.2 NAUSEA AND VOMITING, UNSPECIFIED VOMITING TYPE: ICD-10-CM

## 2025-01-09 DIAGNOSIS — R10.13 EPIGASTRIC PAIN: Primary | ICD-10-CM

## 2025-01-09 DIAGNOSIS — R10.13 EPIGASTRIC PAIN: ICD-10-CM

## 2025-01-09 DIAGNOSIS — R19.7 DIARRHEA OF PRESUMED INFECTIOUS ORIGIN: Primary | ICD-10-CM

## 2025-01-09 LAB
ALBUMIN SERPL BCP-MCNC: 4.6 G/DL (ref 3.4–5)
ALP SERPL-CCNC: 58 U/L (ref 33–110)
ALT SERPL W P-5'-P-CCNC: 59 U/L (ref 7–45)
ANION GAP BLDV CALCULATED.4IONS-SCNC: 8 MMOL/L (ref 10–25)
ANION GAP SERPL CALC-SCNC: 17 MMOL/L (ref 10–20)
AST SERPL W P-5'-P-CCNC: 34 U/L (ref 9–39)
B-HCG SERPL-ACNC: <2 MIU/ML
BASE EXCESS BLDV CALC-SCNC: 3.3 MMOL/L (ref -2–3)
BASOPHILS # BLD AUTO: 0.04 X10*3/UL (ref 0–0.1)
BASOPHILS NFR BLD AUTO: 0.2 %
BILIRUB DIRECT SERPL-MCNC: 0.1 MG/DL (ref 0–0.3)
BILIRUB SERPL-MCNC: 0.8 MG/DL (ref 0–1.2)
BODY TEMPERATURE: ABNORMAL
BUN SERPL-MCNC: 12 MG/DL (ref 6–23)
CA-I BLDV-SCNC: 1.23 MMOL/L (ref 1.1–1.33)
CALCIUM SERPL-MCNC: 9.9 MG/DL (ref 8.6–10.3)
CARDIAC TROPONIN I PNL SERPL HS: <3 NG/L (ref 0–13)
CARDIAC TROPONIN I PNL SERPL HS: <3 NG/L (ref 0–13)
CHLORIDE BLDV-SCNC: 104 MMOL/L (ref 98–107)
CHLORIDE SERPL-SCNC: 102 MMOL/L (ref 98–107)
CO2 SERPL-SCNC: 23 MMOL/L (ref 21–32)
CREAT SERPL-MCNC: 1.07 MG/DL (ref 0.5–1.05)
EGFRCR SERPLBLD CKD-EPI 2021: 70 ML/MIN/1.73M*2
EOSINOPHIL # BLD AUTO: 0.72 X10*3/UL (ref 0–0.7)
EOSINOPHIL NFR BLD AUTO: 4.2 %
ERYTHROCYTE [DISTWIDTH] IN BLOOD BY AUTOMATED COUNT: 11.8 % (ref 11.5–14.5)
FLUAV RNA RESP QL NAA+PROBE: NOT DETECTED
FLUBV RNA RESP QL NAA+PROBE: NOT DETECTED
GLUCOSE BLDV-MCNC: 107 MG/DL (ref 74–99)
GLUCOSE SERPL-MCNC: 100 MG/DL (ref 74–99)
HCO3 BLDV-SCNC: 27.8 MMOL/L (ref 22–26)
HCT VFR BLD AUTO: 45.7 % (ref 36–46)
HCT VFR BLD EST: 49 % (ref 36–46)
HGB BLD-MCNC: 15.7 G/DL (ref 12–16)
HGB BLDV-MCNC: 16.4 G/DL (ref 12–16)
IMM GRANULOCYTES # BLD AUTO: 0.08 X10*3/UL (ref 0–0.7)
IMM GRANULOCYTES NFR BLD AUTO: 0.5 % (ref 0–0.9)
INHALED O2 CONCENTRATION: 21 %
LACTATE BLDV-SCNC: 2.5 MMOL/L (ref 0.4–2)
LACTATE SERPL-SCNC: 1.4 MMOL/L (ref 0.4–2)
LIPASE SERPL-CCNC: 20 U/L (ref 9–82)
LYMPHOCYTES # BLD AUTO: 2.21 X10*3/UL (ref 1.2–4.8)
LYMPHOCYTES NFR BLD AUTO: 13 %
MAGNESIUM SERPL-MCNC: 1.94 MG/DL (ref 1.6–2.4)
MCH RBC QN AUTO: 31.5 PG (ref 26–34)
MCHC RBC AUTO-ENTMCNC: 34.4 G/DL (ref 32–36)
MCV RBC AUTO: 92 FL (ref 80–100)
MONOCYTES # BLD AUTO: 1.09 X10*3/UL (ref 0.1–1)
MONOCYTES NFR BLD AUTO: 6.4 %
NEUTROPHILS # BLD AUTO: 12.91 X10*3/UL (ref 1.2–7.7)
NEUTROPHILS NFR BLD AUTO: 75.7 %
NRBC BLD-RTO: 0 /100 WBCS (ref 0–0)
OXYHGB MFR BLDV: 48.2 % (ref 45–75)
PCO2 BLDV: 41 MM HG (ref 41–51)
PH BLDV: 7.44 PH (ref 7.33–7.43)
PLATELET # BLD AUTO: 328 X10*3/UL (ref 150–450)
PO2 BLDV: 31 MM HG (ref 35–45)
POTASSIUM BLDV-SCNC: 3.9 MMOL/L (ref 3.5–5.3)
POTASSIUM SERPL-SCNC: 4 MMOL/L (ref 3.5–5.3)
PROT SERPL-MCNC: 7.3 G/DL (ref 6.4–8.2)
RBC # BLD AUTO: 4.98 X10*6/UL (ref 4–5.2)
SAO2 % BLDV: 49 % (ref 45–75)
SARS-COV-2 RNA RESP QL NAA+PROBE: NOT DETECTED
SODIUM BLDV-SCNC: 136 MMOL/L (ref 136–145)
SODIUM SERPL-SCNC: 138 MMOL/L (ref 136–145)
WBC # BLD AUTO: 17.1 X10*3/UL (ref 4.4–11.3)

## 2025-01-09 PROCEDURE — 82435 ASSAY OF BLOOD CHLORIDE: CPT | Performed by: EMERGENCY MEDICINE

## 2025-01-09 PROCEDURE — 84132 ASSAY OF SERUM POTASSIUM: CPT | Performed by: EMERGENCY MEDICINE

## 2025-01-09 PROCEDURE — 96374 THER/PROPH/DIAG INJ IV PUSH: CPT

## 2025-01-09 PROCEDURE — 83735 ASSAY OF MAGNESIUM: CPT | Performed by: EMERGENCY MEDICINE

## 2025-01-09 PROCEDURE — 74176 CT ABD & PELVIS W/O CONTRAST: CPT

## 2025-01-09 PROCEDURE — 99285 EMERGENCY DEPT VISIT HI MDM: CPT | Mod: 25 | Performed by: EMERGENCY MEDICINE

## 2025-01-09 PROCEDURE — 84484 ASSAY OF TROPONIN QUANT: CPT | Performed by: EMERGENCY MEDICINE

## 2025-01-09 PROCEDURE — 84295 ASSAY OF SERUM SODIUM: CPT | Performed by: EMERGENCY MEDICINE

## 2025-01-09 PROCEDURE — 74176 CT ABD & PELVIS W/O CONTRAST: CPT | Performed by: RADIOLOGY

## 2025-01-09 PROCEDURE — 96375 TX/PRO/DX INJ NEW DRUG ADDON: CPT

## 2025-01-09 PROCEDURE — 87636 SARSCOV2 & INF A&B AMP PRB: CPT | Performed by: EMERGENCY MEDICINE

## 2025-01-09 PROCEDURE — 83605 ASSAY OF LACTIC ACID: CPT | Performed by: EMERGENCY MEDICINE

## 2025-01-09 PROCEDURE — 83690 ASSAY OF LIPASE: CPT | Performed by: EMERGENCY MEDICINE

## 2025-01-09 PROCEDURE — 2500000004 HC RX 250 GENERAL PHARMACY W/ HCPCS (ALT 636 FOR OP/ED): Performed by: EMERGENCY MEDICINE

## 2025-01-09 PROCEDURE — 36415 COLL VENOUS BLD VENIPUNCTURE: CPT | Performed by: EMERGENCY MEDICINE

## 2025-01-09 PROCEDURE — 93005 ELECTROCARDIOGRAM TRACING: CPT

## 2025-01-09 PROCEDURE — 85025 COMPLETE CBC W/AUTO DIFF WBC: CPT | Performed by: EMERGENCY MEDICINE

## 2025-01-09 PROCEDURE — 84702 CHORIONIC GONADOTROPIN TEST: CPT | Performed by: EMERGENCY MEDICINE

## 2025-01-09 PROCEDURE — 71045 X-RAY EXAM CHEST 1 VIEW: CPT | Performed by: RADIOLOGY

## 2025-01-09 PROCEDURE — 99204 OFFICE O/P NEW MOD 45 MIN: CPT | Performed by: INTERNAL MEDICINE

## 2025-01-09 PROCEDURE — 71045 X-RAY EXAM CHEST 1 VIEW: CPT

## 2025-01-09 PROCEDURE — 96376 TX/PRO/DX INJ SAME DRUG ADON: CPT

## 2025-01-09 PROCEDURE — 96361 HYDRATE IV INFUSION ADD-ON: CPT

## 2025-01-09 PROCEDURE — 82248 BILIRUBIN DIRECT: CPT | Performed by: EMERGENCY MEDICINE

## 2025-01-09 PROCEDURE — 96372 THER/PROPH/DIAG INJ SC/IM: CPT | Performed by: EMERGENCY MEDICINE

## 2025-01-09 RX ORDER — PANTOPRAZOLE SODIUM 40 MG/10ML
40 INJECTION, POWDER, LYOPHILIZED, FOR SOLUTION INTRAVENOUS ONCE
Status: DISCONTINUED | OUTPATIENT
Start: 2025-01-09 | End: 2025-01-09 | Stop reason: HOSPADM

## 2025-01-09 RX ORDER — DICYCLOMINE HYDROCHLORIDE 10 MG/ML
20 INJECTION INTRAMUSCULAR ONCE
Status: COMPLETED | OUTPATIENT
Start: 2025-01-09 | End: 2025-01-09

## 2025-01-09 RX ORDER — DICYCLOMINE HYDROCHLORIDE 20 MG/1
20 TABLET ORAL 2 TIMES DAILY
Qty: 20 TABLET | Refills: 0 | Status: SHIPPED | OUTPATIENT
Start: 2025-01-09 | End: 2025-01-19

## 2025-01-09 RX ORDER — ONDANSETRON HYDROCHLORIDE 2 MG/ML
4 INJECTION, SOLUTION INTRAVENOUS ONCE
Status: COMPLETED | OUTPATIENT
Start: 2025-01-09 | End: 2025-01-09

## 2025-01-09 RX ORDER — METOCLOPRAMIDE 5 MG/1
5 TABLET ORAL 3 TIMES DAILY PRN
Qty: 10 TABLET | Refills: 0 | Status: SHIPPED | OUTPATIENT
Start: 2025-01-09 | End: 2025-01-14

## 2025-01-09 RX ORDER — METOCLOPRAMIDE HYDROCHLORIDE 5 MG/ML
5 INJECTION INTRAMUSCULAR; INTRAVENOUS ONCE
Status: COMPLETED | OUTPATIENT
Start: 2025-01-09 | End: 2025-01-09

## 2025-01-09 RX ORDER — MORPHINE SULFATE 4 MG/ML
4 INJECTION INTRAVENOUS ONCE
Status: COMPLETED | OUTPATIENT
Start: 2025-01-09 | End: 2025-01-09

## 2025-01-09 RX ADMIN — METOCLOPRAMIDE 5 MG: 5 INJECTION, SOLUTION INTRAMUSCULAR; INTRAVENOUS at 08:20

## 2025-01-09 RX ADMIN — ONDANSETRON 4 MG: 2 INJECTION, SOLUTION INTRAMUSCULAR; INTRAVENOUS at 07:24

## 2025-01-09 RX ADMIN — SODIUM CHLORIDE 1000 ML: 9 INJECTION, SOLUTION INTRAVENOUS at 07:25

## 2025-01-09 RX ADMIN — MORPHINE SULFATE 4 MG: 4 INJECTION INTRAVENOUS at 08:20

## 2025-01-09 RX ADMIN — MORPHINE SULFATE 4 MG: 4 INJECTION INTRAVENOUS at 07:24

## 2025-01-09 RX ADMIN — DICYCLOMINE HYDROCHLORIDE 20 MG: 10 INJECTION, SOLUTION INTRAMUSCULAR at 10:13

## 2025-01-09 ASSESSMENT — COLUMBIA-SUICIDE SEVERITY RATING SCALE - C-SSRS
2. HAVE YOU ACTUALLY HAD ANY THOUGHTS OF KILLING YOURSELF?: NO
6. HAVE YOU EVER DONE ANYTHING, STARTED TO DO ANYTHING, OR PREPARED TO DO ANYTHING TO END YOUR LIFE?: NO
1. IN THE PAST MONTH, HAVE YOU WISHED YOU WERE DEAD OR WISHED YOU COULD GO TO SLEEP AND NOT WAKE UP?: NO

## 2025-01-09 ASSESSMENT — PAIN DESCRIPTION - PROGRESSION: CLINICAL_PROGRESSION: NOT CHANGED

## 2025-01-09 ASSESSMENT — PAIN - FUNCTIONAL ASSESSMENT: PAIN_FUNCTIONAL_ASSESSMENT: 0-10

## 2025-01-09 ASSESSMENT — PAIN SCALES - GENERAL
PAINLEVEL_OUTOF10: 8
PAINLEVEL_OUTOF10: 0 - NO PAIN

## 2025-01-09 ASSESSMENT — PAIN DESCRIPTION - ORIENTATION: ORIENTATION: UPPER

## 2025-01-09 ASSESSMENT — PAIN DESCRIPTION - LOCATION: LOCATION: ABDOMEN

## 2025-01-09 ASSESSMENT — LIFESTYLE VARIABLES
HAVE YOU EVER FELT YOU SHOULD CUT DOWN ON YOUR DRINKING: NO
HAVE PEOPLE ANNOYED YOU BY CRITICIZING YOUR DRINKING: NO
TOTAL SCORE: 0
EVER FELT BAD OR GUILTY ABOUT YOUR DRINKING: NO
EVER HAD A DRINK FIRST THING IN THE MORNING TO STEADY YOUR NERVES TO GET RID OF A HANGOVER: NO

## 2025-01-09 ASSESSMENT — ENCOUNTER SYMPTOMS
DIARRHEA: 1
ABDOMINAL PAIN: 1
VOMITING: 1
NAUSEA: 1

## 2025-01-09 ASSESSMENT — PAIN DESCRIPTION - PAIN TYPE: TYPE: ACUTE PAIN

## 2025-01-09 NOTE — Clinical Note
"January 9, 2025     Tonie El MD  [id number] [address]  Department Of Emergency Medicine  [facility] [address]    Patient: Hannah Zayas   YOB: 1990   Date of Visit: 1/9/2025       Dear Dr. Tonie El MD:    Thank you for referring Hannah Zayas to me for evaluation. Below are my notes for this consultation.  If you have questions, please do not hesitate to call me. I look forward to following your patient along with you.       Sincerely,     Ambar Tolbert MD      CC: No Recipients  ______________________________________________________________________________________    Chief Complaint: Tata Zayas \"Lizeth" is a 34 y.o. female who presents for Abdominal Pain and Diarrhea.  Abdominal Pain  Associated Symptoms:    diarrhea, nausea and vomiting    Diarrhea   Associated symptoms include abdominal pain and vomiting.     34-year-old female with history of hypermobility arthralgia/Nura-Danlos syndrome, Fibromyalgia obesity came to GI clinic for further evaluation.  Today She discharged from the ER. She  Presented to ER with symptom of abdominal pain, predominantly epigastric with nausea vomiting diarrhea for last 2 weeks.  Diarrhea-loose to watery bowel movement , variable frequency 2-6 times .prior to start symptom her  had flulike symptom but not similar GI symptom.  Labs shows leukocytosis.  Denies fever, hematochezia.  She was offered to stay in the hospital instead she wanted to be discharged.  Was given dicyclomine, Reglan on DC from ER, not started yet.  She was started semaglutide since November 2024.  No antibiotic prior to GI symptom.  She drinks alcohol daily, but not heavy drink  Currently she can tolerate oral diet.  Labs shows elevated transaminase for last 2 years likely due to Fatty liver.  Lab in ER:  COVID-negative.  AST, ALT 59/34 with normal bilirubin and alkaline phosphatase, creatinine 1.07, lipase, magnesium within normal limit, WBC 17.1.  Hepatitis C antibody " negative checked in the past.  X-ray chest negative.  CT scan abdomen pelvis: Fatty liver otherwise unremarkable.  Endoscopy should be avoided given diagnosis of Nura-Danlos syndrome due to increased tissue fragility  Review of Systems   Gastrointestinal:  Positive for abdominal pain, diarrhea, nausea and vomiting.     12 Point ROS negative outside of symptoms stated above in HPI    Past Medical History:   Diagnosis Date    Anxiety disorder, unspecified     Anxiety and depression    Depression     Eczema     Encounter for immunization     Varicella vaccine    Gastro-esophageal reflux disease without esophagitis     Gastroesophageal reflux disease, esophagitis presence not specified    Gestational diabetes mellitus in pregnancy, unspecified control     Gestational diabetes mellitus (GDM), antepartum, gestational diabetes method of control unspecified    Headache     Other injury of unspecified body region, initial encounter     Infected wound    Other specified diabetes mellitus without complications     Diabetes mellitus of other type without complication    Personal history of other diseases of the respiratory system     History of asthma    Personal history of other mental and behavioral disorders     History of depression       Past Surgical History:   Procedure Laterality Date    ANKLE SURGERY Left      SECTION, LOW TRANSVERSE  10/27/2016     Section Low Transverse    EYE SURGERY  2019    MOUTH SURGERY  10/27/2016    Oral Surgery Tooth Extraction    WISDOM TOOTH EXTRACTION         Family medical history includes stroke in maternal grandmother.     reports that she has quit smoking. Her smoking use included cigarettes. She has a 10 pack-year smoking history. She uses smokeless tobacco. She reports current alcohol use of about 20.0 standard drinks of alcohol per week. She reports that she does not use drugs.    Allergies   Allergen Reactions    Adhesive Tape-Silicones Itching        Imaging  XR chest 1 view    Result Date: 1/9/2025  Interpreted By:  Serg Melvin, STUDY: XR CHEST 1 VIEW; 1/9/2025 7:54 am   INDICATION: Signs/Symptoms:epigastric pain; vomiting.   COMPARISON: 05/17/2023   ACCESSION NUMBER(S): [id number]   ORDERING CLINICIAN: BRYCE LEMA   FINDINGS: LINES, TUBES, DEVICES: None.   LUNGS: No pulmonary consolidation, pleural effusion or pneumothorax.   CARDIOMEDIASTINAL SILHOUETTE: Cardiomediastinal silhouette is normal in size and configuration.   ABDOMEN: No remarkable upper abdominal findings.   BONES: No acute osseous abnormality.       No acute cardiopulmonary process.   Signed by: Serg Melvin 1/9/2025 7:58 AM Dictation workstation:   [id number]    CT abdomen pelvis wo IV contrast    Result Date: 1/9/2025  Interpreted By:  Serg Melvin, STUDY: CT ABDOMEN PELVIS WO IV CONTRAST;  1/9/2025 7:51 am   INDICATION: Signs/Symptoms:severe epigastric pain; n/v; pancreatitis?.   COMPARISON: 05/18/2023   ACCESSION NUMBER(S): [id number]   ORDERING CLINICIAN: BRYCE LEMA   TECHNIQUE: CT of the abdomen and pelvis was performed. No oral, no intravenous contrast.   FINDINGS: LOWER CHEST: Images of the lung bases show no infiltrate or pleural fluid.   ABDOMEN:   LIVER: Fatty infiltration of the liver. No focal mass lesions.   BILE DUCTS: There is no intrahepatic, common hepatic or common bile ductal dilatation.   GALLBLADDER: The gallbladder is unremarkable.   PANCREAS: The pancreas is unremarkable.   SPLEEN: The spleen is unremarkable. There is no splenic mass or splenomegaly.   ADRENAL GLANDS: The adrenal glands are unremarkable.   KIDNEYS, URETERS and BLADDER: The kidneys demonstrate no mass.  There is no intrarenal calculus or hydronephrosis.   BOWEL: There is no bowel wall thickening, dilatation or obstruction. Appendix unremarkable.   VESSELS: No aneurysm. IVC within normal limits.   PERITONEUM/RETROPERITONEUM/LYMPH NODES: There is no retroperitoneal or pelvic adenopathy.  There  is no ascites.   REPRODUCTIVE ORGANS: Unremarkable.   ABDOMINAL WALL: The abdominal wall is unremarkable.   BONE AND SOFT TISSUE: There is no acute osseous finding.   There is no soft tissue abnormality.       1. No acute process within the abdomen or pelvis. 2. Fatty liver.   MACRO: None.   Signed by: Serg Melvin 1/9/2025 7:58 AM Dictation workstation:   [id number]        Laboratory  Results for orders placed or performed during the hospital encounter of 01/09/25 (from the past 96 hours)   CBC and Auto Differential   Result Value Ref Range    WBC 17.1 (H) 4.4 - 11.3 x10*3/uL    nRBC 0.0 0.0 - 0.0 /100 WBCs    RBC 4.98 4.00 - 5.20 x10*6/uL    Hemoglobin 15.7 12.0 - 16.0 g/dL    Hematocrit 45.7 36.0 - 46.0 %    MCV 92 80 - 100 fL    MCH 31.5 26.0 - 34.0 pg    MCHC 34.4 32.0 - 36.0 g/dL    RDW 11.8 11.5 - 14.5 %    Platelets 328 150 - 450 x10*3/uL    Neutrophils % 75.7 40.0 - 80.0 %    Immature Granulocytes %, Automated 0.5 0.0 - 0.9 %    Lymphocytes % 13.0 13.0 - 44.0 %    Monocytes % 6.4 2.0 - 10.0 %    Eosinophils % 4.2 0.0 - 6.0 %    Basophils % 0.2 0.0 - 2.0 %    Neutrophils Absolute 12.91 (H) 1.20 - 7.70 x10*3/uL    Immature Granulocytes Absolute, Automated 0.08 0.00 - 0.70 x10*3/uL    Lymphocytes Absolute 2.21 1.20 - 4.80 x10*3/uL    Monocytes Absolute 1.09 (H) 0.10 - 1.00 x10*3/uL    Eosinophils Absolute 0.72 (H) 0.00 - 0.70 x10*3/uL    Basophils Absolute 0.04 0.00 - 0.10 x10*3/uL   Basic metabolic panel   Result Value Ref Range    Glucose 100 (H) 74 - 99 mg/dL    Sodium 138 136 - 145 mmol/L    Potassium 4.0 3.5 - 5.3 mmol/L    Chloride 102 98 - 107 mmol/L    Bicarbonate 23 21 - 32 mmol/L    Anion Gap 17 10 - 20 mmol/L    Urea Nitrogen 12 6 - 23 mg/dL    Creatinine 1.07 (H) 0.50 - 1.05 mg/dL    eGFR 70 >60 mL/min/1.73m*2    Calcium 9.9 8.6 - 10.3 mg/dL   Magnesium   Result Value Ref Range    Magnesium 1.94 1.60 - 2.40 mg/dL   Lipase   Result Value Ref Range    Lipase 20 9 - 82 U/L   Hepatic function panel    Result Value Ref Range    Albumin 4.6 3.4 - 5.0 g/dL    Bilirubin, Total 0.8 0.0 - 1.2 mg/dL    Bilirubin, Direct 0.1 0.0 - 0.3 mg/dL    Alkaline Phosphatase 58 33 - 110 U/L    ALT 59 (H) 7 - 45 U/L    AST 34 9 - 39 U/L    Total Protein 7.3 6.4 - 8.2 g/dL   Lactate   Result Value Ref Range    Lactate 1.4 0.4 - 2.0 mmol/L   Blood Gas Venous Full Panel   Result Value Ref Range    POCT pH, Venous 7.44 (H) 7.33 - 7.43 pH    POCT pCO2, Venous 41 41 - 51 mm Hg    POCT pO2, Venous 31 (L) 35 - 45 mm Hg    POCT SO2, Venous 49 45 - 75 %    POCT Oxy Hemoglobin, Venous 48.2 45.0 - 75.0 %    POCT Hematocrit Calculated, Venous 49.0 (H) 36.0 - 46.0 %    POCT Sodium, Venous 136 136 - 145 mmol/L    POCT Potassium, Venous 3.9 3.5 - 5.3 mmol/L    POCT Chloride, Venous 104 98 - 107 mmol/L    POCT Ionized Calicum, Venous 1.23 1.10 - 1.33 mmol/L    POCT Glucose, Venous 107 (H) 74 - 99 mg/dL    POCT Lactate, Venous 2.5 (H) 0.4 - 2.0 mmol/L    POCT Base Excess, Venous 3.3 (H) -2.0 - 3.0 mmol/L    POCT HCO3 Calculated, Venous 27.8 (H) 22.0 - 26.0 mmol/L    POCT Hemoglobin, Venous 16.4 (H) 12.0 - 16.0 g/dL    POCT Anion Gap, Venous 8.0 (L) 10.0 - 25.0 mmol/L    Patient Temperature      FiO2 21 %   Troponin I, High Sensitivity, Initial   Result Value Ref Range    Troponin I, High Sensitivity <3 0 - 13 ng/L   hCG, quantitative, pregnancy   Result Value Ref Range    HCG, Beta-Quantitative <2 <5 mIU/mL   Troponin, High Sensitivity, 1 Hour   Result Value Ref Range    Troponin I, High Sensitivity <3 0 - 13 ng/L   Sars-CoV-2 and Influenza A/B PCR   Result Value Ref Range    Flu A Result Not Detected Not Detected    Flu B Result Not Detected Not Detected    Coronavirus 2019, PCR Not Detected Not Detected        Objective      Current Outpatient Medications:     dicyclomine (Bentyl) 20 mg tablet, Take 1 tablet (20 mg) by mouth 2 times a day for 10 days., Disp: 20 tablet, Rfl: 0    metoclopramide (Reglan) 5 mg tablet, Take 1 tablet (5 mg) by mouth 3  times a day as needed (as needed for nausea/vomiting) for up to 5 days., Disp: 10 tablet, Rfl: 0    sertraline (Zoloft) 100 mg tablet, TAKE 1 TABLET BY MOUTH EVERY DAY, Disp: 90 tablet, Rfl: 1    traZODone (Desyrel) 100 mg tablet, TAKE 2 TABLETS (200 MG) BY MOUTH AS NEEDED AT BEDTIME FOR SLEEP., Disp: 180 tablet, Rfl: 1    gabapentin (Neurontin) 300 mg capsule, Take 1 capsule (300 mg) by mouth 3 times a day. Start with one cap at night for 3 nights , then one cap twice a day for 3 days. And then continue with 3 times a day, Disp: 90 capsule, Rfl: 0  No current facility-administered medications for this visit.    Facility-Administered Medications Ordered in Other Visits:     pantoprazole (ProtoNix) injection 40 mg, 40 mg, intravenous, Once, Tonie El MD    Last Recorded Vitals  Blood pressure 112/77, pulse 104, weight 79.4 kg (175 lb), SpO2 94%.    Physical Exam  HENT:      Mouth/Throat:      Mouth: Mucous membranes are moist.   Eyes:      General: No scleral icterus.  Cardiovascular:      Rate and Rhythm: Regular rhythm. Tachycardia present.   Pulmonary:      Effort: Pulmonary effort is normal. No respiratory distress.   Abdominal:      General: Abdomen is flat. Bowel sounds are normal.      Palpations: Abdomen is soft.      Tenderness: There is abdominal tenderness.      Comments: Mild epigastric    Musculoskeletal:      Right lower leg: No edema.      Left lower leg: No edema.   Skin:     Coloration: Skin is not jaundiced.   Neurological:      Mental Status: She is alert and oriented to person, place, and time.         Assessment/Plan  Acute diarrhea with abdominal pain, nausea vomiting rule out gastroenteritis  Fatty liver     Hydration  Symptomatic management  Stool workup   Any worsening symptom please come to ER.  Hepatology workup like lab-hepatitis A total antibody, hepatitis B surface antibody,hepatitis B surface antigen, FibroScan in next visit  Alcohol abstinence  Follow-up with me in 2 to 3  months

## 2025-01-09 NOTE — PROGRESS NOTES
"Chief Complaint: Tata Zayas \"Lizeth" is a 34 y.o. female who presents for Abdominal Pain and Diarrhea.  Abdominal Pain  Associated Symptoms:    diarrhea, nausea and vomiting    Diarrhea   Associated symptoms include abdominal pain and vomiting.     34-year-old female with history of hypermobility arthralgia/Nura-Danlos syndrome, Fibromyalgia obesity came to GI clinic for further evaluation.  Today She discharged from the ER. She  Presented to ER with symptom of abdominal pain, predominantly epigastric with nausea vomiting diarrhea for last 2 weeks.  Diarrhea-loose to watery bowel movement , variable frequency 2-6 times .prior to start symptom her  had flulike symptom but not similar GI symptom.  Labs shows leukocytosis.  Denies fever, hematochezia.  She was offered to stay in the hospital instead she wanted to be discharged.  Was given dicyclomine, Reglan on DC from ER, not started yet.  She was started semaglutide since November 2024.  No antibiotic prior to GI symptom.  She drinks alcohol daily, but not heavy drink  Currently she can tolerate oral diet.  Labs shows elevated transaminase for last 2 years likely due to Fatty liver.  Lab in ER:  COVID-negative.  AST, ALT 59/34 with normal bilirubin and alkaline phosphatase, creatinine 1.07, lipase, magnesium within normal limit, WBC 17.1.  Hepatitis C antibody negative checked in the past.  X-ray chest negative.  CT scan abdomen pelvis: Fatty liver otherwise unremarkable.  Endoscopy should be avoided given diagnosis of Nura-Danlos syndrome due to increased tissue fragility  Review of Systems   Gastrointestinal:  Positive for abdominal pain, diarrhea, nausea and vomiting.     12 Point ROS negative outside of symptoms stated above in HPI    Past Medical History:   Diagnosis Date    Anxiety disorder, unspecified     Anxiety and depression    Depression     Eczema     Encounter for immunization     Varicella vaccine    Gastro-esophageal reflux disease " without esophagitis     Gastroesophageal reflux disease, esophagitis presence not specified    Gestational diabetes mellitus in pregnancy, unspecified control     Gestational diabetes mellitus (GDM), antepartum, gestational diabetes method of control unspecified    Headache     Other injury of unspecified body region, initial encounter     Infected wound    Other specified diabetes mellitus without complications     Diabetes mellitus of other type without complication    Personal history of other diseases of the respiratory system     History of asthma    Personal history of other mental and behavioral disorders     History of depression       Past Surgical History:   Procedure Laterality Date    ANKLE SURGERY Left      SECTION, LOW TRANSVERSE  10/27/2016     Section Low Transverse    EYE SURGERY  2019    MOUTH SURGERY  10/27/2016    Oral Surgery Tooth Extraction    WISDOM TOOTH EXTRACTION         Family medical history includes stroke in maternal grandmother.     reports that she has quit smoking. Her smoking use included cigarettes. She has a 10 pack-year smoking history. She uses smokeless tobacco. She reports current alcohol use of about 20.0 standard drinks of alcohol per week. She reports that she does not use drugs.    Allergies   Allergen Reactions    Adhesive Tape-Silicones Itching       Imaging  XR chest 1 view    Result Date: 2025  Interpreted By:  Serg Melvin, STUDY: XR CHEST 1 VIEW; 2025 7:54 am   INDICATION: Signs/Symptoms:epigastric pain; vomiting.   COMPARISON: 2023   ACCESSION NUMBER(S): [id number]   ORDERING CLINICIAN: BRYCE LEMA   FINDINGS: LINES, TUBES, DEVICES: None.   LUNGS: No pulmonary consolidation, pleural effusion or pneumothorax.   CARDIOMEDIASTINAL SILHOUETTE: Cardiomediastinal silhouette is normal in size and configuration.   ABDOMEN: No remarkable upper abdominal findings.   BONES: No acute osseous abnormality.       No acute cardiopulmonary  process.   Signed by: Serg Melvin 1/9/2025 7:58 AM Dictation workstation:   [id number]    CT abdomen pelvis wo IV contrast    Result Date: 1/9/2025  Interpreted By:  Serg Melvin, STUDY: CT ABDOMEN PELVIS WO IV CONTRAST;  1/9/2025 7:51 am   INDICATION: Signs/Symptoms:severe epigastric pain; n/v; pancreatitis?.   COMPARISON: 05/18/2023   ACCESSION NUMBER(S): [id number]   ORDERING CLINICIAN: BRYCE LEMA   TECHNIQUE: CT of the abdomen and pelvis was performed. No oral, no intravenous contrast.   FINDINGS: LOWER CHEST: Images of the lung bases show no infiltrate or pleural fluid.   ABDOMEN:   LIVER: Fatty infiltration of the liver. No focal mass lesions.   BILE DUCTS: There is no intrahepatic, common hepatic or common bile ductal dilatation.   GALLBLADDER: The gallbladder is unremarkable.   PANCREAS: The pancreas is unremarkable.   SPLEEN: The spleen is unremarkable. There is no splenic mass or splenomegaly.   ADRENAL GLANDS: The adrenal glands are unremarkable.   KIDNEYS, URETERS and BLADDER: The kidneys demonstrate no mass.  There is no intrarenal calculus or hydronephrosis.   BOWEL: There is no bowel wall thickening, dilatation or obstruction. Appendix unremarkable.   VESSELS: No aneurysm. IVC within normal limits.   PERITONEUM/RETROPERITONEUM/LYMPH NODES: There is no retroperitoneal or pelvic adenopathy.  There is no ascites.   REPRODUCTIVE ORGANS: Unremarkable.   ABDOMINAL WALL: The abdominal wall is unremarkable.   BONE AND SOFT TISSUE: There is no acute osseous finding.   There is no soft tissue abnormality.       1. No acute process within the abdomen or pelvis. 2. Fatty liver.   MACRO: None.   Signed by: Serg Melvin 1/9/2025 7:58 AM Dictation workstation:   [id number]        Laboratory  Results for orders placed or performed during the hospital encounter of 01/09/25 (from the past 96 hours)   CBC and Auto Differential   Result Value Ref Range    WBC 17.1 (H) 4.4 - 11.3 x10*3/uL    nRBC 0.0 0.0 - 0.0 /100  WBCs    RBC 4.98 4.00 - 5.20 x10*6/uL    Hemoglobin 15.7 12.0 - 16.0 g/dL    Hematocrit 45.7 36.0 - 46.0 %    MCV 92 80 - 100 fL    MCH 31.5 26.0 - 34.0 pg    MCHC 34.4 32.0 - 36.0 g/dL    RDW 11.8 11.5 - 14.5 %    Platelets 328 150 - 450 x10*3/uL    Neutrophils % 75.7 40.0 - 80.0 %    Immature Granulocytes %, Automated 0.5 0.0 - 0.9 %    Lymphocytes % 13.0 13.0 - 44.0 %    Monocytes % 6.4 2.0 - 10.0 %    Eosinophils % 4.2 0.0 - 6.0 %    Basophils % 0.2 0.0 - 2.0 %    Neutrophils Absolute 12.91 (H) 1.20 - 7.70 x10*3/uL    Immature Granulocytes Absolute, Automated 0.08 0.00 - 0.70 x10*3/uL    Lymphocytes Absolute 2.21 1.20 - 4.80 x10*3/uL    Monocytes Absolute 1.09 (H) 0.10 - 1.00 x10*3/uL    Eosinophils Absolute 0.72 (H) 0.00 - 0.70 x10*3/uL    Basophils Absolute 0.04 0.00 - 0.10 x10*3/uL   Basic metabolic panel   Result Value Ref Range    Glucose 100 (H) 74 - 99 mg/dL    Sodium 138 136 - 145 mmol/L    Potassium 4.0 3.5 - 5.3 mmol/L    Chloride 102 98 - 107 mmol/L    Bicarbonate 23 21 - 32 mmol/L    Anion Gap 17 10 - 20 mmol/L    Urea Nitrogen 12 6 - 23 mg/dL    Creatinine 1.07 (H) 0.50 - 1.05 mg/dL    eGFR 70 >60 mL/min/1.73m*2    Calcium 9.9 8.6 - 10.3 mg/dL   Magnesium   Result Value Ref Range    Magnesium 1.94 1.60 - 2.40 mg/dL   Lipase   Result Value Ref Range    Lipase 20 9 - 82 U/L   Hepatic function panel   Result Value Ref Range    Albumin 4.6 3.4 - 5.0 g/dL    Bilirubin, Total 0.8 0.0 - 1.2 mg/dL    Bilirubin, Direct 0.1 0.0 - 0.3 mg/dL    Alkaline Phosphatase 58 33 - 110 U/L    ALT 59 (H) 7 - 45 U/L    AST 34 9 - 39 U/L    Total Protein 7.3 6.4 - 8.2 g/dL   Lactate   Result Value Ref Range    Lactate 1.4 0.4 - 2.0 mmol/L   Blood Gas Venous Full Panel   Result Value Ref Range    POCT pH, Venous 7.44 (H) 7.33 - 7.43 pH    POCT pCO2, Venous 41 41 - 51 mm Hg    POCT pO2, Venous 31 (L) 35 - 45 mm Hg    POCT SO2, Venous 49 45 - 75 %    POCT Oxy Hemoglobin, Venous 48.2 45.0 - 75.0 %    POCT Hematocrit  Calculated, Venous 49.0 (H) 36.0 - 46.0 %    POCT Sodium, Venous 136 136 - 145 mmol/L    POCT Potassium, Venous 3.9 3.5 - 5.3 mmol/L    POCT Chloride, Venous 104 98 - 107 mmol/L    POCT Ionized Calicum, Venous 1.23 1.10 - 1.33 mmol/L    POCT Glucose, Venous 107 (H) 74 - 99 mg/dL    POCT Lactate, Venous 2.5 (H) 0.4 - 2.0 mmol/L    POCT Base Excess, Venous 3.3 (H) -2.0 - 3.0 mmol/L    POCT HCO3 Calculated, Venous 27.8 (H) 22.0 - 26.0 mmol/L    POCT Hemoglobin, Venous 16.4 (H) 12.0 - 16.0 g/dL    POCT Anion Gap, Venous 8.0 (L) 10.0 - 25.0 mmol/L    Patient Temperature      FiO2 21 %   Troponin I, High Sensitivity, Initial   Result Value Ref Range    Troponin I, High Sensitivity <3 0 - 13 ng/L   hCG, quantitative, pregnancy   Result Value Ref Range    HCG, Beta-Quantitative <2 <5 mIU/mL   Troponin, High Sensitivity, 1 Hour   Result Value Ref Range    Troponin I, High Sensitivity <3 0 - 13 ng/L   Sars-CoV-2 and Influenza A/B PCR   Result Value Ref Range    Flu A Result Not Detected Not Detected    Flu B Result Not Detected Not Detected    Coronavirus 2019, PCR Not Detected Not Detected        Objective       Current Outpatient Medications:     dicyclomine (Bentyl) 20 mg tablet, Take 1 tablet (20 mg) by mouth 2 times a day for 10 days., Disp: 20 tablet, Rfl: 0    metoclopramide (Reglan) 5 mg tablet, Take 1 tablet (5 mg) by mouth 3 times a day as needed (as needed for nausea/vomiting) for up to 5 days., Disp: 10 tablet, Rfl: 0    sertraline (Zoloft) 100 mg tablet, TAKE 1 TABLET BY MOUTH EVERY DAY, Disp: 90 tablet, Rfl: 1    traZODone (Desyrel) 100 mg tablet, TAKE 2 TABLETS (200 MG) BY MOUTH AS NEEDED AT BEDTIME FOR SLEEP., Disp: 180 tablet, Rfl: 1    gabapentin (Neurontin) 300 mg capsule, Take 1 capsule (300 mg) by mouth 3 times a day. Start with one cap at night for 3 nights , then one cap twice a day for 3 days. And then continue with 3 times a day, Disp: 90 capsule, Rfl: 0  No current facility-administered medications  for this visit.    Facility-Administered Medications Ordered in Other Visits:     pantoprazole (ProtoNix) injection 40 mg, 40 mg, intravenous, Once, Tonie El MD    Last Recorded Vitals  Blood pressure 112/77, pulse 104, weight 79.4 kg (175 lb), SpO2 94%.    Physical Exam  HENT:      Mouth/Throat:      Mouth: Mucous membranes are moist.   Eyes:      General: No scleral icterus.  Cardiovascular:      Rate and Rhythm: Regular rhythm. Tachycardia present.   Pulmonary:      Effort: Pulmonary effort is normal. No respiratory distress.   Abdominal:      General: Abdomen is flat. Bowel sounds are normal.      Palpations: Abdomen is soft.      Tenderness: There is abdominal tenderness.      Comments: Mild epigastric    Musculoskeletal:      Right lower leg: No edema.      Left lower leg: No edema.   Skin:     Coloration: Skin is not jaundiced.   Neurological:      Mental Status: She is alert and oriented to person, place, and time.         Assessment/Plan   Acute diarrhea with abdominal pain, nausea vomiting rule out gastroenteritis  Fatty liver     Hydration  Symptomatic management  Stool workup   Any worsening symptom please come to ER.  Hepatology workup like lab-hepatitis A total antibody, hepatitis B surface antibody,hepatitis B surface antigen, FibroScan in next visit  Alcohol abstinence  Follow-up with me in 2 to 3 months

## 2025-01-09 NOTE — Clinical Note
Tata Zayas was seen and treated in our emergency department on 1/9/2025.  She may return to work on 01/11/2025.       If you have any questions or concerns, please don't hesitate to call.      Tonie El MD

## 2025-01-09 NOTE — PATIENT INSTRUCTIONS
Hydration  Stool workup   any worsening symptom please come to ER.  Follow-up with me in 2 to 3 months

## 2025-01-09 NOTE — ED PROVIDER NOTES
Tata Zayas  34 y.o.    HPI  Presents to the ED with worsening epigastric and generalized abdominal pain.  It has been going on about 2 weeks.  For the past 2 days she has had some nausea and vomiting.  Diarrhea for about a week.  No fevers.  She states her  was sick a week or 2 ago with some nausea and vomiting she thought she might have the same thing but he has gotten better and she has not.  She does drink alcohol daily.  She has had 2 C-sections but otherwise no abdominal surgeries.  Cannot recall her last menstrual period but is not actively trying to get pregnant.  She does take semaglutide and has been taking this since 2024.  No history of pancreatitis.       Patient History   Past Medical History:   Diagnosis Date    Anxiety disorder, unspecified     Anxiety and depression    Depression     Eczema     Encounter for immunization     Varicella vaccine    Gastro-esophageal reflux disease without esophagitis     Gastroesophageal reflux disease, esophagitis presence not specified    Gestational diabetes mellitus in pregnancy, unspecified control     Gestational diabetes mellitus (GDM), antepartum, gestational diabetes method of control unspecified    Headache     Other injury of unspecified body region, initial encounter     Infected wound    Other specified diabetes mellitus without complications     Diabetes mellitus of other type without complication    Personal history of other diseases of the respiratory system     History of asthma    Personal history of other mental and behavioral disorders     History of depression     Past Surgical History:   Procedure Laterality Date    ANKLE SURGERY Left      SECTION, LOW TRANSVERSE  10/27/2016     Section Low Transverse    EYE SURGERY  2019    MOUTH SURGERY  10/27/2016    Oral Surgery Tooth Extraction    WISDOM TOOTH EXTRACTION       Family History   Problem Relation Name Age of Onset    Heart disease Mother Ramona  "Donaldne     Hypertension Mother Ramona Bonilla     Depression Father Jose Manuel Bonilla     Hypertension Father Jose Manuel Bonilla     Kidney disease Father Jose Manuel Bonilla     Mental illness Father Jose Manuel Bonilla     Cancer Maternal Grandfather Nile Hinds     Depression Maternal Grandfather Nile Hinds     Heart disease Maternal Grandfather Nile Hinds     Hypertension Maternal Grandfather Nile Hinds     Diabetes Maternal Grandmother Tari Hinds     Stroke Maternal Grandmother Tari Hinds     Alcohol abuse Paternal Grandfather Bryce Bonilla     Depression Sister Blanca Bonilla     Mental illness Sister Blanca Bonilla      Social History     Tobacco Use    Smoking status: Former     Current packs/day: 1.00     Average packs/day: 1 pack/day for 10.0 years (10.0 ttl pk-yrs)     Types: Cigarettes    Smokeless tobacco: Current   Vaping Use    Vaping status: Every Day    Substances: Nicotine    Devices: Pre-filled or refillable cartridge   Substance Use Topics    Alcohol use: Yes     Alcohol/week: 20.0 standard drinks of alcohol     Types: 20 Shots of liquor per week     Comment: 2 or 3 drinks daily    Drug use: Never       Physical Exam   Vitals:    01/09/25 0638 01/09/25 0641   BP:  (!) 137/105   BP Location:  Right arm   Patient Position:  Lying   Pulse:  96   Resp:  20   Temp:  36.1 °C (97 °F)   SpO2:  98%   Weight: 79.8 kg (176 lb)    Height: 1.549 m (5' 1\")         Constitutional: Appears uncomfortable  Eyes: PERRL, Conjunctiva normal, No discharge  HEENT: Atraumatic. External ears appear normal, Nose is without drainage, MMM, no intraoral lesions  Neck: Normal ROM, No lymphadenopathy, No stridor  Cardiac: Regular rhythm and rate  Pulmonary/Chest: No respiratory distress, Normal breath sounds, No chest tenderness  Abdomen: BS present, Soft, diffuse tenderness to palpation most pronounced in the epigastric region  Back: No tenderness, No contusions  Extremities: Normal ROM, No edema. No tenderness, intact distal pulses  Skin: Warm " and dry, No rashes, No erythema  Neurologic: Alert and oriented x 3, Speech clear/fluent. Normal motor function, Normal sensation, No focal neurologic deficits  Psychiatric: Normal affect, Normal judgement, Normal mood      EKG interpreted by me: Sinus tachycardia rate of 101.  No ST segment elevation concerning for acute MI.  MN is 126.  QTc is 497.    ED Course & MDM     This is a 34-year-old female with a history of Nura-Danlos, fibromyalgia, GERD, daily alcohol use, on semaglutide who comes to the emergency department with worsening epigastric pain for the past 2 weeks, nausea and vomiting for the past 2 days and diarrhea for 1 week.  No fevers.  On exam she does appear uncomfortable.  Blood pressure is elevated as well as heart rate about 100.  Basic blood work was obtained and the patient was medicated for her symptoms.  Focused imaging was ordered.  Somewhat surprisingly imaging was overall nonacute.  Laboratory workup was generally within normal limits.  White count was 17 with increase in the absolute neutrophils.  Lactate on the venous blood gas was 2.5.  This improved to 1.4 after some IV fluids.  Patient was reassessed and remedicated.  I reviewed the test results with the patient.  At this point unclear as exact etiology of her symptoms.  Hospitalization was considered.  Patient was engaged in shared decision making regarding disposition.  At this point there is not necessarily an objective reason to stay in the hospital but could discussed the case with the hospitalist for symptom control.  Patient did tolerate water while here.  I placed a referral to GI.  We were able to get her an appointment later this morning.      Impression   Diagnoses as of 01/09/25 1031   Epigastric pain        Disposition  discharge         Tonie El MD  01/09/25 1033

## 2025-01-10 ENCOUNTER — LAB (OUTPATIENT)
Dept: LAB | Facility: LAB | Age: 35
End: 2025-01-10
Payer: COMMERCIAL

## 2025-01-10 DIAGNOSIS — R19.7 DIARRHEA OF PRESUMED INFECTIOUS ORIGIN: ICD-10-CM

## 2025-01-10 PROCEDURE — 87506 IADNA-DNA/RNA PROBE TQ 6-11: CPT

## 2025-01-10 PROCEDURE — 87329 GIARDIA AG IA: CPT

## 2025-01-10 PROCEDURE — 87328 CRYPTOSPORIDIUM AG IA: CPT

## 2025-01-10 PROCEDURE — 87493 C DIFF AMPLIFIED PROBE: CPT

## 2025-01-11 LAB
C COLI+JEJ+UPSA DNA STL QL NAA+PROBE: NOT DETECTED
C DIF TOX TCDA+TCDB STL QL NAA+PROBE: NOT DETECTED
EC STX1 GENE STL QL NAA+PROBE: NOT DETECTED
EC STX2 GENE STL QL NAA+PROBE: NOT DETECTED
NOROVIRUS GI + GII RNA STL NAA+PROBE: NOT DETECTED
RV RNA STL NAA+PROBE: NOT DETECTED
SALMONELLA DNA STL QL NAA+PROBE: NOT DETECTED
SHIGELLA DNA SPEC QL NAA+PROBE: NOT DETECTED
V CHOLERAE DNA STL QL NAA+PROBE: NOT DETECTED
Y ENTEROCOL DNA STL QL NAA+PROBE: NOT DETECTED

## 2025-01-13 LAB
CRYPTOSP AG STL QL IA: NEGATIVE
G LAMBLIA AG STL QL IA: NEGATIVE

## 2025-01-23 LAB
ATRIAL RATE: 101 BPM
P AXIS: 36 DEGREES
P OFFSET: 197 MS
P ONSET: 154 MS
PR INTERVAL: 126 MS
Q ONSET: 217 MS
QRS COUNT: 16 BEATS
QRS DURATION: 78 MS
QT INTERVAL: 384 MS
QTC CALCULATION(BAZETT): 497 MS
QTC FREDERICIA: 456 MS
R AXIS: 13 DEGREES
T AXIS: -9 DEGREES
T OFFSET: 409 MS
VENTRICULAR RATE: 101 BPM

## 2025-01-30 ENCOUNTER — TELEPHONE (OUTPATIENT)
Dept: PRIMARY CARE | Facility: CLINIC | Age: 35
End: 2025-01-30
Payer: COMMERCIAL

## 2025-01-30 NOTE — TELEPHONE ENCOUNTER
Pt called for an appointment to discuss chronic pain treatment plan,  she has all over/ fibromyalgia 1st available is 3-3-25, did not want to wait, was seeing pain management Dr but her insurance does not cover treatment anymore

## 2025-03-20 ENCOUNTER — APPOINTMENT (OUTPATIENT)
Dept: GASTROENTEROLOGY | Facility: CLINIC | Age: 35
End: 2025-03-20
Payer: COMMERCIAL

## 2025-05-17 DIAGNOSIS — F33.1 MODERATE EPISODE OF RECURRENT MAJOR DEPRESSIVE DISORDER: ICD-10-CM

## 2025-05-19 RX ORDER — SERTRALINE HYDROCHLORIDE 100 MG/1
100 TABLET, FILM COATED ORAL DAILY
Qty: 90 TABLET | Refills: 1 | Status: SHIPPED | OUTPATIENT
Start: 2025-05-19

## 2025-08-31 DIAGNOSIS — F33.1 MODERATE EPISODE OF RECURRENT MAJOR DEPRESSIVE DISORDER: ICD-10-CM

## 2025-09-03 RX ORDER — TRAZODONE HYDROCHLORIDE 100 MG/1
200 TABLET ORAL NIGHTLY PRN
Qty: 60 TABLET | Refills: 0 | Status: SHIPPED | OUTPATIENT
Start: 2025-09-03 | End: 2025-09-03

## 2025-09-30 ENCOUNTER — APPOINTMENT (OUTPATIENT)
Dept: PRIMARY CARE | Facility: CLINIC | Age: 35
End: 2025-09-30
Payer: COMMERCIAL